# Patient Record
Sex: MALE | Race: WHITE | Employment: OTHER | URBAN - METROPOLITAN AREA
[De-identification: names, ages, dates, MRNs, and addresses within clinical notes are randomized per-mention and may not be internally consistent; named-entity substitution may affect disease eponyms.]

---

## 2017-01-10 PROBLEM — R06.00 DYSPNEA ON EXERTION: Status: ACTIVE | Noted: 2017-01-10

## 2017-02-23 PROBLEM — R06.00 DYSPNEA ON EXERTION: Status: RESOLVED | Noted: 2017-01-10 | Resolved: 2017-02-23

## 2017-08-29 ENCOUNTER — LAB REQUISITION (OUTPATIENT)
Dept: LAB | Facility: HOSPITAL | Age: 82
End: 2017-08-29
Attending: FAMILY MEDICINE
Payer: MEDICARE

## 2017-08-29 DIAGNOSIS — Z79.899 OTHER LONG TERM (CURRENT) DRUG THERAPY: ICD-10-CM

## 2017-08-29 LAB
INR BLD: 2.46 (ref 0.89–1.11)
PSA SERPL DL<=0.01 NG/ML-MCNC: 27.1 SECONDS (ref 12–14.3)

## 2017-08-29 PROCEDURE — 85610 PROTHROMBIN TIME: CPT | Performed by: FAMILY MEDICINE

## 2017-08-31 ENCOUNTER — OFFICE VISIT (OUTPATIENT)
Dept: SURGERY | Facility: CLINIC | Age: 82
End: 2017-08-31

## 2017-08-31 VITALS
SYSTOLIC BLOOD PRESSURE: 137 MMHG | DIASTOLIC BLOOD PRESSURE: 79 MMHG | HEIGHT: 75 IN | WEIGHT: 220 LBS | BODY MASS INDEX: 27.35 KG/M2 | HEART RATE: 61 BPM | TEMPERATURE: 98 F

## 2017-08-31 DIAGNOSIS — R35.1 NOCTURIA: Primary | ICD-10-CM

## 2017-08-31 DIAGNOSIS — R82.90 URINE FINDING: ICD-10-CM

## 2017-08-31 LAB
APPEARANCE: CLEAR
BILIRUB UR QL: NEGATIVE
CLARITY UR: CLEAR
COLOR UR: YELLOW
GLUCOSE UR-MCNC: NEGATIVE MG/DL
HGB UR QL STRIP.AUTO: NEGATIVE
KETONES UR-MCNC: NEGATIVE MG/DL
LEUKOCYTE ESTERASE UR QL STRIP.AUTO: NEGATIVE
MULTISTIX LOT#: NORMAL NUMERIC
NITRITE UR QL STRIP.AUTO: NEGATIVE
PH UR: 5 [PH] (ref 5–8)
PH, URINE: 5.5 (ref 4.5–8)
PROT UR-MCNC: NEGATIVE MG/DL
SP GR UR STRIP: 1.01 (ref 1–1.03)
SPECIFIC GRAVITY: 1.01 (ref 1–1.03)
URINE-COLOR: YELLOW
UROBILINOGEN UR STRIP-ACNC: <2
UROBILINOGEN,SEMI-QN: 0.2 MG/DL (ref 0–1.9)
VIT C UR-MCNC: NEGATIVE MG/DL

## 2017-08-31 PROCEDURE — G0463 HOSPITAL OUTPT CLINIC VISIT: HCPCS | Performed by: UROLOGY

## 2017-08-31 PROCEDURE — 99204 OFFICE O/P NEW MOD 45 MIN: CPT | Performed by: UROLOGY

## 2017-08-31 PROCEDURE — 81003 URINALYSIS AUTO W/O SCOPE: CPT | Performed by: UROLOGY

## 2017-08-31 NOTE — PROGRESS NOTES
SUBJECTIVE:  Maikel Jurist is a 80year old male who presents for a consultation at the request of, and a copy of this note will be sent to, Dr. Mao Roman, for evaluation of  benign prostatic hyperplasia. He states that the problem is unchanged.  Symptoms i pleurisy. CARDIOVASCULAR:  Negative for pain or chest discomfort, dizziness or fainting, palpitations, leg swelling, nocturia, or claudication.   GASTROINTESTINAL:  Negative for nausea, vomiting, diarrhea, constipation, heartburn or indigestion, abdominal

## 2017-09-01 ENCOUNTER — OFFICE VISIT (OUTPATIENT)
Dept: WOUND CARE | Facility: HOSPITAL | Age: 82
End: 2017-09-01
Attending: NURSE PRACTITIONER
Payer: MEDICARE

## 2017-09-01 DIAGNOSIS — L89.322 STAGE II PRESSURE ULCER OF LEFT BUTTOCK (HCC): Primary | ICD-10-CM

## 2017-09-01 DIAGNOSIS — L89.312 STAGE II PRESSURE ULCER OF RIGHT BUTTOCK (HCC): ICD-10-CM

## 2017-09-01 PROCEDURE — 97161 PT EVAL LOW COMPLEX 20 MIN: CPT

## 2017-09-01 NOTE — PROGRESS NOTES
Thank you for your referral. Please co-sign  this letter using the CO-SIGN button to certify the need for these services furnished under this plan of treatment and while under my care. If you have any questions, please contact me at Dept.  Dept: [phone]

## 2017-09-01 NOTE — PROGRESS NOTES
Subjective    Chief Complaint  This information was obtained from the patient  The patient is new to the [address] [facility],[address] here for an initial visit for the evaluation and management of non-healing wound(s). decubitus ulcer of right buttock stage 2.     Allergie Gastro Esoph.  Reflux Disease (GERD)  hypertrophy of prostate without urinary obstruction and other lower urinary tract symptoms  Osteoarthritis  Deep Vein Thrombosis (DVT)    Medications  warfarin 5 mg tablet oral tablet oral  pravastatin 20 mg tablet oral Wound #2 Left Buttock is an acute Stage 2 Pressure Injury Pressure Ulcer and has received a status of Not Healed. Initial wound encounter measurements are 6.5cm length x 1.8cm width x 0.01cm depth, with an area of 11.7 sq cm and a volume of 0.117 cubic cm. The patient's current level of functional limitation is [id number], other PT functional limitation, 0% impaired. Limitation is determined by clinical judgement. The projected goal for this patient is [id number], other PT functional limitation, 0% impaired. Thank you for your referral. Please co-sign  this letter using the CO-SIGN button to certify the need for these services furnished under this plan of treatment and while under my care. If you have any questions, please contact me at Dept.  Dept: [phone]

## 2017-09-05 ENCOUNTER — OFFICE VISIT (OUTPATIENT)
Dept: WOUND CARE | Facility: HOSPITAL | Age: 82
End: 2017-09-05
Attending: NURSE PRACTITIONER
Payer: MEDICARE

## 2017-09-05 DIAGNOSIS — L89.312 STAGE II PRESSURE ULCER OF RIGHT BUTTOCK (HCC): ICD-10-CM

## 2017-09-05 DIAGNOSIS — L89.322 STAGE II PRESSURE ULCER OF LEFT BUTTOCK (HCC): Primary | ICD-10-CM

## 2017-09-05 PROCEDURE — 99213 OFFICE O/P EST LOW 20 MIN: CPT

## 2017-09-05 NOTE — PROGRESS NOTES
Subjective    Chief Complaint  This information was obtained from the patient  The patient is new to the [address] [facility],[address] here for an initial visit for the evaluation and management of non-healing wound(s).  decubitus ulcer of right buttock stage 2.  9/5/17 no Stage 2 Pressure Injury Pressure Ulcer and has received a status of Not Healed. There is a small amount of sero-sanguineous drainage noted which has no odor. The patient reports a wound pain of level 0/10. The wound margin is well defined.  Wound bed has 51 using Tegaderm (1)  Selective / Non-selective debridement  Tissue and other material debrided: using Devitalized epidermis (1)  Debridement instrument used: using Forceps (1), Scissors (1)  Written PT Goals - . Short Term (4-6 weeks)  Reduce wound area by 1

## 2017-09-08 ENCOUNTER — OFFICE VISIT (OUTPATIENT)
Dept: WOUND CARE | Facility: HOSPITAL | Age: 82
End: 2017-09-08
Attending: NURSE PRACTITIONER
Payer: MEDICARE

## 2017-09-08 DIAGNOSIS — L89.312 STAGE II PRESSURE ULCER OF RIGHT BUTTOCK (HCC): ICD-10-CM

## 2017-09-08 DIAGNOSIS — L89.322 STAGE II PRESSURE ULCER OF LEFT BUTTOCK (HCC): ICD-10-CM

## 2017-09-08 DIAGNOSIS — S91.109A: Primary | ICD-10-CM

## 2017-09-08 PROCEDURE — 97597 DBRDMT OPN WND 1ST 20 CM/<: CPT

## 2017-09-08 NOTE — PROGRESS NOTES
Subjective    Chief Complaint  This information was obtained from the patient  The patient is new to the [address] [facility],[address] here for an initial visit for the evaluation and management of non-healing wound(s).  decubitus ulcer of right buttock stage 2.  9/8/17 pat a small amount of sanguineous drainage noted which has no odor. The patient reports a wound pain of level 0/10. The wound margin is well defined. Wound bed has 51-75% epithelialization, 1-25% eschar; no slough present. The wound is improving.    The periwou shower and it tore skin from the tip of his toe. Assessment  Pt presented with a new script to eval and treat his L toe wound. Pt presents with a partial thickness wound on the tip of his L great toe traumatic in nature.  pt attempted to perform self Signature(s): Date(s):  Treatment Notes Summary  Wound #1 (Right Buttock)  . Wound Treatment Note  Assessed patient’s pain status and effectiveness of pain management plan. Cleansed wound and periwound with non-cytotoxic agent.  using Wound Cleanser Spray debridement  Cleansed wound and periwound with non-cytotoxic agent.  using Wound Cleanser Spray (1)  Performed selective sharp debridement to wound  Tissue and other material debrided: using Devitalized dermis (1), Devitalized epidermis (1)  Debridement ins

## 2017-09-12 ENCOUNTER — OFFICE VISIT (OUTPATIENT)
Dept: WOUND CARE | Facility: HOSPITAL | Age: 82
End: 2017-09-12
Attending: NURSE PRACTITIONER
Payer: MEDICARE

## 2017-09-12 DIAGNOSIS — L89.322 STAGE II PRESSURE ULCER OF LEFT BUTTOCK (HCC): ICD-10-CM

## 2017-09-12 DIAGNOSIS — S91.109A: Primary | ICD-10-CM

## 2017-09-12 DIAGNOSIS — L89.312 STAGE II PRESSURE ULCER OF RIGHT BUTTOCK (HCC): ICD-10-CM

## 2017-09-12 PROCEDURE — 99212 OFFICE O/P EST SF 10 MIN: CPT

## 2017-09-12 NOTE — PROGRESS NOTES
Subjective    Chief Complaint  This information was obtained from the patient  The patient is new to the [address] [facility],[address] here for an initial visit for the evaluation and management of non-healing wound(s).  decubitus ulcer of right buttock stage 2.  9/8/17 pat Wound #3 Left Toe great is an acute Partial Thickness Trauma Wound and has received a status of Not Healed. Subsequent wound encounter measurements are 0.03cm length x 0.02cm width x 0.1cm depth, with an area of 0.001 sq cm and a volume of 0 cubic cm.  Ther Continue 2x/wk for skilled PT in wound care clinic for wound care, specialty dressing applications, debridement of devitalized tissue as needed and pt may benefit from a transition to a barrier cream to protect reviatalized tissue regions when wounds are a Assessed patient’s pain status and effectiveness of pain management plan. Cleansed wound and periwound with non-cytotoxic agent. using Wound Cleanser Spray (1)  Applied Primary Wound Dressing.  using Gauze (sterile) 4x4 (2), Xeroform 2x2 (1)  Dressing secu

## 2017-09-15 ENCOUNTER — OFFICE VISIT (OUTPATIENT)
Dept: WOUND CARE | Facility: HOSPITAL | Age: 82
End: 2017-09-15
Attending: NURSE PRACTITIONER
Payer: MEDICARE

## 2017-09-15 DIAGNOSIS — S91.109A: Primary | ICD-10-CM

## 2017-09-15 DIAGNOSIS — L89.322 STAGE II PRESSURE ULCER OF LEFT BUTTOCK (HCC): ICD-10-CM

## 2017-09-15 DIAGNOSIS — L89.312 STAGE II PRESSURE ULCER OF RIGHT BUTTOCK (HCC): ICD-10-CM

## 2017-09-15 PROCEDURE — 99212 OFFICE O/P EST SF 10 MIN: CPT

## 2017-09-15 NOTE — PROGRESS NOTES
Subjective    Chief Complaint  This information was obtained from the patient  The patient is new to the [address] [facility],[address] here for an initial visit for the evaluation and management of non-healing wound(s).  decubitus ulcer of right buttock stage 2.  9/8/17 pat wound encounter measurements are 0.4cm length x 0.2cm width x 0.01cm depth, with an area of 0.08 sq cm and a volume of 0.001 cubic cm. No tunneling has been noted. No sinus tract has been noted. No undermining has been noted. There was no drainage noted.  Lilly Hernandes buttock, stage 2  S91.102S - Unspecified open wound of left great toe without damage to nail, sequela        Plan    Continue 1x/wk 2-3 more visits anticipated to resolve R buttock.  Transition to barrier cream upon wound resolution for skin protection and

## 2017-09-19 ENCOUNTER — OFFICE VISIT (OUTPATIENT)
Dept: WOUND CARE | Facility: HOSPITAL | Age: 82
End: 2017-09-19
Attending: NURSE PRACTITIONER
Payer: MEDICARE

## 2017-09-19 DIAGNOSIS — S91.109A: ICD-10-CM

## 2017-09-19 DIAGNOSIS — L89.322 STAGE II PRESSURE ULCER OF LEFT BUTTOCK (HCC): Primary | ICD-10-CM

## 2017-09-19 DIAGNOSIS — L89.312 STAGE II PRESSURE ULCER OF RIGHT BUTTOCK (HCC): ICD-10-CM

## 2017-09-19 PROCEDURE — 99211 OFF/OP EST MAY X REQ PHY/QHP: CPT

## 2017-09-19 NOTE — PROGRESS NOTES
Subjective    Chief Complaint  This information was obtained from the patient  The patient is new to the [address] [facility],[address] here for an initial visit for the evaluation and management of non-healing wound(s).  decubitus ulcer of right buttock stage 2.  9/8/17 pat Wound #1 Right Buttock is an acute Pressure Ulcer and has received an outcome of Resolved. Subsequent wound encounter measurements are 0cm length x 0cm width x 0cm depth, with an area of 0 sq cm and a volume of 0 cubic cm. No tunneling has been noted.  No s .Wound Treatment Note  Assessed patient’s pain status and effectiveness of pain management plan. Cleansed wound and periwound with non-cytotoxic agent. using Wound Cleanser Spray (1)  Applied topical product to jd-wound area avoiding wound base.  using B

## 2017-09-22 ENCOUNTER — OFFICE VISIT (OUTPATIENT)
Dept: WOUND CARE | Facility: HOSPITAL | Age: 82
End: 2017-09-22
Attending: NURSE PRACTITIONER
Payer: MEDICARE

## 2017-09-22 DIAGNOSIS — S91.109A: ICD-10-CM

## 2017-09-22 DIAGNOSIS — L89.312 STAGE II PRESSURE ULCER OF RIGHT BUTTOCK (HCC): ICD-10-CM

## 2017-09-22 DIAGNOSIS — L89.322 STAGE II PRESSURE ULCER OF LEFT BUTTOCK (HCC): Primary | ICD-10-CM

## 2017-09-22 PROCEDURE — 99211 OFF/OP EST MAY X REQ PHY/QHP: CPT

## 2017-09-22 NOTE — PROGRESS NOTES
Subjective    Chief Complaint  This information was obtained from the patient  The patient is new to the [address] [facility],[address] here for an initial visit for the evaluation and management of non-healing wound(s).  decubitus ulcer of right buttock stage 2.  9/8/17 pat care with daily application of barrier cream after bathing for skin protection in his buttock region. Pt is wearing a moisture  diaper to prevent denuded tissue and reoccurance of skin breakdown. D/C wcc visits at this time.    Active Problems    IC

## 2017-09-26 ENCOUNTER — APPOINTMENT (OUTPATIENT)
Dept: WOUND CARE | Facility: HOSPITAL | Age: 82
End: 2017-09-26
Attending: NURSE PRACTITIONER
Payer: MEDICARE

## 2017-09-29 ENCOUNTER — APPOINTMENT (OUTPATIENT)
Dept: WOUND CARE | Facility: HOSPITAL | Age: 82
End: 2017-09-29
Attending: NURSE PRACTITIONER
Payer: MEDICARE

## 2017-11-28 PROBLEM — L89.312 STAGE II PRESSURE ULCER OF RIGHT BUTTOCK (HCC): Status: RESOLVED | Noted: 2017-09-01 | Resolved: 2017-11-28

## 2017-11-28 PROBLEM — S91.109A WOUND, OPEN, TOE: Status: RESOLVED | Noted: 2017-09-08 | Resolved: 2017-11-28

## 2017-11-28 PROBLEM — L89.322 STAGE II PRESSURE ULCER OF LEFT BUTTOCK (HCC): Status: RESOLVED | Noted: 2017-09-01 | Resolved: 2017-11-28

## 2017-12-17 ENCOUNTER — HOSPITAL ENCOUNTER (EMERGENCY)
Facility: HOSPITAL | Age: 82
Discharge: HOME | End: 2017-12-17
Attending: EMERGENCY MEDICINE
Payer: MEDICARE

## 2017-12-17 VITALS
HEART RATE: 56 BPM | TEMPERATURE: 97 F | RESPIRATION RATE: 20 BRPM | SYSTOLIC BLOOD PRESSURE: 178 MMHG | DIASTOLIC BLOOD PRESSURE: 80 MMHG | BODY MASS INDEX: 27.35 KG/M2 | HEIGHT: 75 IN | OXYGEN SATURATION: 96 % | WEIGHT: 220 LBS

## 2017-12-17 DIAGNOSIS — S91.115A LACERATION OF LESSER TOE OF LEFT FOOT WITHOUT FOREIGN BODY PRESENT OR DAMAGE TO NAIL, INITIAL ENCOUNTER: Primary | ICD-10-CM

## 2017-12-17 PROCEDURE — 90471 IMMUNIZATION ADMIN: CPT

## 2017-12-17 PROCEDURE — 99283 EMERGENCY DEPT VISIT LOW MDM: CPT

## 2017-12-17 NOTE — ED PROVIDER NOTES
Patient Seen in: [facility] AND [facility] Emergency Department    History   Patient presents with:  Laceration Abrasion (integumentary)    Stated Complaint:     HPI    The patient is an 80-year-old male who went to step on the scale and caught his left third to Left foot: There is laceration. There is normal range of motion, no tenderness, no bony tenderness, no swelling and normal capillary refill.         Feet:    Other than laceration to toe no bony tenderness pain or swelling   Nursing note and vitals rev

## 2017-12-17 NOTE — ED INITIAL ASSESSMENT (HPI)
Pt reports a laceration to bottom his right foot on a scale. Pt states that it happened at 1130 today. Pt states his tetanus is not up to date.  Pt reports no pain but is taking coumadin

## 2018-01-17 PROBLEM — Z51.81 MONITORING FOR LONG-TERM ANTICOAGULANT USE: Status: ACTIVE | Noted: 2018-01-17

## 2018-01-17 PROBLEM — Z79.01 MONITORING FOR LONG-TERM ANTICOAGULANT USE: Status: ACTIVE | Noted: 2018-01-17

## 2018-06-08 ENCOUNTER — HOSPITAL ENCOUNTER (INPATIENT)
Facility: HOSPITAL | Age: 83
LOS: 3 days | Discharge: NURSING, REHAB OR LONG-TERM CARE | DRG: 481 | End: 2018-06-11
Attending: EMERGENCY MEDICINE | Admitting: HOSPITALIST
Payer: MEDICARE

## 2018-06-08 ENCOUNTER — APPOINTMENT (OUTPATIENT)
Dept: GENERAL RADIOLOGY | Facility: HOSPITAL | Age: 83
DRG: 481 | End: 2018-06-08
Attending: EMERGENCY MEDICINE
Payer: MEDICARE

## 2018-06-08 DIAGNOSIS — S72.001A CLOSED RIGHT HIP FRACTURE, INITIAL ENCOUNTER (HCC): Primary | ICD-10-CM

## 2018-06-08 DIAGNOSIS — Z79.01 WARFARIN ANTICOAGULATION: ICD-10-CM

## 2018-06-08 PROCEDURE — 71045 X-RAY EXAM CHEST 1 VIEW: CPT | Performed by: EMERGENCY MEDICINE

## 2018-06-08 PROCEDURE — 93010 ELECTROCARDIOGRAM REPORT: CPT | Performed by: EMERGENCY MEDICINE

## 2018-06-08 PROCEDURE — 86850 RBC ANTIBODY SCREEN: CPT | Performed by: EMERGENCY MEDICINE

## 2018-06-08 PROCEDURE — 73502 X-RAY EXAM HIP UNI 2-3 VIEWS: CPT | Performed by: EMERGENCY MEDICINE

## 2018-06-08 PROCEDURE — 85610 PROTHROMBIN TIME: CPT | Performed by: EMERGENCY MEDICINE

## 2018-06-08 PROCEDURE — 87640 STAPH A DNA AMP PROBE: CPT | Performed by: EMERGENCY MEDICINE

## 2018-06-08 PROCEDURE — 86901 BLOOD TYPING SEROLOGIC RH(D): CPT | Performed by: EMERGENCY MEDICINE

## 2018-06-08 PROCEDURE — 96365 THER/PROPH/DIAG IV INF INIT: CPT

## 2018-06-08 PROCEDURE — 87641 MR-STAPH DNA AMP PROBE: CPT | Performed by: EMERGENCY MEDICINE

## 2018-06-08 PROCEDURE — 96375 TX/PRO/DX INJ NEW DRUG ADDON: CPT

## 2018-06-08 PROCEDURE — 81001 URINALYSIS AUTO W/SCOPE: CPT | Performed by: EMERGENCY MEDICINE

## 2018-06-08 PROCEDURE — 85025 COMPLETE CBC W/AUTO DIFF WBC: CPT | Performed by: EMERGENCY MEDICINE

## 2018-06-08 PROCEDURE — 80048 BASIC METABOLIC PNL TOTAL CA: CPT | Performed by: EMERGENCY MEDICINE

## 2018-06-08 PROCEDURE — 99285 EMERGENCY DEPT VISIT HI MDM: CPT

## 2018-06-08 PROCEDURE — 93005 ELECTROCARDIOGRAM TRACING: CPT

## 2018-06-08 PROCEDURE — 86900 BLOOD TYPING SEROLOGIC ABO: CPT | Performed by: EMERGENCY MEDICINE

## 2018-06-08 RX ORDER — HYDROCODONE BITARTRATE AND ACETAMINOPHEN 5; 325 MG/1; MG/1
2 TABLET ORAL EVERY 4 HOURS PRN
Status: DISCONTINUED | OUTPATIENT
Start: 2018-06-08 | End: 2018-06-09

## 2018-06-08 RX ORDER — SODIUM CHLORIDE 0.9 % (FLUSH) 0.9 %
3 SYRINGE (ML) INJECTION AS NEEDED
Status: DISCONTINUED | OUTPATIENT
Start: 2018-06-08 | End: 2018-06-11

## 2018-06-08 RX ORDER — ONDANSETRON 2 MG/ML
4 INJECTION INTRAMUSCULAR; INTRAVENOUS ONCE
Status: COMPLETED | OUTPATIENT
Start: 2018-06-08 | End: 2018-06-08

## 2018-06-08 RX ORDER — HYDROCODONE BITARTRATE AND ACETAMINOPHEN 5; 325 MG/1; MG/1
1 TABLET ORAL EVERY 4 HOURS PRN
Status: DISCONTINUED | OUTPATIENT
Start: 2018-06-08 | End: 2018-06-09

## 2018-06-08 RX ORDER — ACETAMINOPHEN 325 MG/1
650 TABLET ORAL EVERY 4 HOURS PRN
Status: DISCONTINUED | OUTPATIENT
Start: 2018-06-08 | End: 2018-06-09

## 2018-06-08 RX ORDER — MORPHINE SULFATE 2 MG/ML
2 INJECTION, SOLUTION INTRAMUSCULAR; INTRAVENOUS EVERY 2 HOUR PRN
Status: DISCONTINUED | OUTPATIENT
Start: 2018-06-08 | End: 2018-06-09

## 2018-06-08 RX ORDER — MORPHINE SULFATE 4 MG/ML
4 INJECTION, SOLUTION INTRAMUSCULAR; INTRAVENOUS EVERY 2 HOUR PRN
Status: DISCONTINUED | OUTPATIENT
Start: 2018-06-08 | End: 2018-06-09

## 2018-06-08 RX ORDER — MORPHINE SULFATE 2 MG/ML
1 INJECTION, SOLUTION INTRAMUSCULAR; INTRAVENOUS EVERY 2 HOUR PRN
Status: DISCONTINUED | OUTPATIENT
Start: 2018-06-08 | End: 2018-06-09

## 2018-06-08 RX ORDER — SODIUM CHLORIDE 9 MG/ML
INJECTION, SOLUTION INTRAVENOUS CONTINUOUS
Status: DISCONTINUED | OUTPATIENT
Start: 2018-06-08 | End: 2018-06-11

## 2018-06-08 RX ORDER — MORPHINE SULFATE 4 MG/ML
4 INJECTION, SOLUTION INTRAMUSCULAR; INTRAVENOUS EVERY 30 MIN PRN
Status: DISCONTINUED | OUTPATIENT
Start: 2018-06-08 | End: 2018-06-08

## 2018-06-08 RX ORDER — CEFAZOLIN SODIUM/WATER 2 G/20 ML
2 SYRINGE (ML) INTRAVENOUS ONCE
Status: COMPLETED | OUTPATIENT
Start: 2018-06-09 | End: 2018-06-09

## 2018-06-08 NOTE — ED PROVIDER NOTES
Patient Seen in: [facility] AND [facility] Emergency Department    History   Patient presents with:  Fall (musculoskeletal, neurologic)    Stated Complaint:     HPI    Patient presents to the emergency department with complaint of pain to the right hip after fal Review of Systems  Constitutional: no fever, has otherwise been feeling well, normal appetite, normal energy  Cardiovascular: no chest pain  Respiratory: no shortness of breath  Gastrointestinal: no abdominal pain, no nausea, no vomiting  Ge testing. We will give morphine and Zofran as well. Patient x-ray did show right hip fracture. Preoperative testing was ordered INR was 2.7. I did notify his orthopedic surgeon who did his left hip Dr. Dottie Delcid.   Also notified admitting physician  cardiology consults recently does not make any notation of this.               MDM       Normal  Pulse oximetry    Cardiac Monitor:     Disposition and Plan     We recommend that you schedule follow up care with a primary care provider within the next three

## 2018-06-09 ENCOUNTER — APPOINTMENT (OUTPATIENT)
Dept: GENERAL RADIOLOGY | Facility: HOSPITAL | Age: 83
DRG: 481 | End: 2018-06-09
Attending: ORTHOPAEDIC SURGERY
Payer: MEDICARE

## 2018-06-09 ENCOUNTER — ANESTHESIA (OUTPATIENT)
Dept: SURGERY | Facility: HOSPITAL | Age: 83
DRG: 481 | End: 2018-06-09
Payer: MEDICARE

## 2018-06-09 ENCOUNTER — SURGERY (OUTPATIENT)
Age: 83
End: 2018-06-09

## 2018-06-09 ENCOUNTER — ANESTHESIA EVENT (OUTPATIENT)
Dept: SURGERY | Facility: HOSPITAL | Age: 83
DRG: 481 | End: 2018-06-09
Payer: MEDICARE

## 2018-06-09 PROCEDURE — 85610 PROTHROMBIN TIME: CPT | Performed by: HOSPITALIST

## 2018-06-09 PROCEDURE — 73502 X-RAY EXAM HIP UNI 2-3 VIEWS: CPT | Performed by: ORTHOPAEDIC SURGERY

## 2018-06-09 PROCEDURE — 85610 PROTHROMBIN TIME: CPT | Performed by: ORTHOPAEDIC SURGERY

## 2018-06-09 PROCEDURE — 0QH604Z INSERTION OF INTERNAL FIXATION DEVICE INTO RIGHT UPPER FEMUR, OPEN APPROACH: ICD-10-PCS | Performed by: ORTHOPAEDIC SURGERY

## 2018-06-09 PROCEDURE — 36430 TRANSFUSION BLD/BLD COMPNT: CPT

## 2018-06-09 PROCEDURE — 81001 URINALYSIS AUTO W/SCOPE: CPT | Performed by: INTERNAL MEDICINE

## 2018-06-09 PROCEDURE — 51701 INSERT BLADDER CATHETER: CPT

## 2018-06-09 PROCEDURE — 80048 BASIC METABOLIC PNL TOTAL CA: CPT | Performed by: HOSPITALIST

## 2018-06-09 PROCEDURE — 86927 PLASMA FRESH FROZEN: CPT

## 2018-06-09 PROCEDURE — 30233K1 TRANSFUSION OF NONAUTOLOGOUS FROZEN PLASMA INTO PERIPHERAL VEIN, PERCUTANEOUS APPROACH: ICD-10-PCS | Performed by: INTERNAL MEDICINE

## 2018-06-09 PROCEDURE — 76001 XR C-ARM FLUORO >1 HOUR  (CPT=76001): CPT | Performed by: ORTHOPAEDIC SURGERY

## 2018-06-09 DEVICE — IMPLANTABLE DEVICE: Type: IMPLANTABLE DEVICE | Site: HIP | Status: FUNCTIONAL

## 2018-06-09 DEVICE — PLATE TUBE STD 135DEG 4 HOLE: Type: IMPLANTABLE DEVICE | Site: HIP | Status: FUNCTIONAL

## 2018-06-09 RX ORDER — SODIUM CHLORIDE, SODIUM LACTATE, POTASSIUM CHLORIDE, CALCIUM CHLORIDE 600; 310; 30; 20 MG/100ML; MG/100ML; MG/100ML; MG/100ML
INJECTION, SOLUTION INTRAVENOUS CONTINUOUS
Status: DISCONTINUED | OUTPATIENT
Start: 2018-06-09 | End: 2018-06-09 | Stop reason: HOSPADM

## 2018-06-09 RX ORDER — METOPROLOL TARTRATE 5 MG/5ML
2.5 INJECTION INTRAVENOUS ONCE
Status: DISCONTINUED | OUTPATIENT
Start: 2018-06-09 | End: 2018-06-09 | Stop reason: HOSPADM

## 2018-06-09 RX ORDER — DEXAMETHASONE SODIUM PHOSPHATE 4 MG/ML
VIAL (ML) INJECTION AS NEEDED
Status: DISCONTINUED | OUTPATIENT
Start: 2018-06-09 | End: 2018-06-09 | Stop reason: SURG

## 2018-06-09 RX ORDER — DIPHENHYDRAMINE HYDROCHLORIDE 50 MG/ML
12.5 INJECTION INTRAMUSCULAR; INTRAVENOUS EVERY 4 HOURS PRN
Status: DISCONTINUED | OUTPATIENT
Start: 2018-06-09 | End: 2018-06-11

## 2018-06-09 RX ORDER — HYDROCODONE BITARTRATE AND ACETAMINOPHEN 5; 325 MG/1; MG/1
1 TABLET ORAL AS NEEDED
Status: DISCONTINUED | OUTPATIENT
Start: 2018-06-09 | End: 2018-06-09 | Stop reason: HOSPADM

## 2018-06-09 RX ORDER — BISACODYL 10 MG
10 SUPPOSITORY, RECTAL RECTAL
Status: DISCONTINUED | OUTPATIENT
Start: 2018-06-09 | End: 2018-06-11

## 2018-06-09 RX ORDER — HYDROMORPHONE HYDROCHLORIDE 1 MG/ML
0.4 INJECTION, SOLUTION INTRAMUSCULAR; INTRAVENOUS; SUBCUTANEOUS EVERY 5 MIN PRN
Status: DISCONTINUED | OUTPATIENT
Start: 2018-06-09 | End: 2018-06-09 | Stop reason: HOSPADM

## 2018-06-09 RX ORDER — HYDROMORPHONE HYDROCHLORIDE 1 MG/ML
0.4 INJECTION, SOLUTION INTRAMUSCULAR; INTRAVENOUS; SUBCUTANEOUS EVERY 2 HOUR PRN
Status: DISCONTINUED | OUTPATIENT
Start: 2018-06-09 | End: 2018-06-11

## 2018-06-09 RX ORDER — SODIUM CHLORIDE 0.9 % (FLUSH) 0.9 %
10 SYRINGE (ML) INJECTION AS NEEDED
Status: DISCONTINUED | OUTPATIENT
Start: 2018-06-09 | End: 2018-06-11

## 2018-06-09 RX ORDER — DOCUSATE SODIUM 100 MG/1
100 CAPSULE, LIQUID FILLED ORAL 2 TIMES DAILY
Status: DISCONTINUED | OUTPATIENT
Start: 2018-06-09 | End: 2018-06-11

## 2018-06-09 RX ORDER — SODIUM CHLORIDE 9 MG/ML
INJECTION, SOLUTION INTRAVENOUS ONCE
Status: COMPLETED | OUTPATIENT
Start: 2018-06-09 | End: 2018-06-09

## 2018-06-09 RX ORDER — HALOPERIDOL 5 MG/ML
0.25 INJECTION INTRAMUSCULAR ONCE AS NEEDED
Status: DISCONTINUED | OUTPATIENT
Start: 2018-06-09 | End: 2018-06-09 | Stop reason: HOSPADM

## 2018-06-09 RX ORDER — WARFARIN SODIUM 7.5 MG/1
7.5 TABLET ORAL ONCE
Status: COMPLETED | OUTPATIENT
Start: 2018-06-09 | End: 2018-06-09

## 2018-06-09 RX ORDER — ONDANSETRON 2 MG/ML
4 INJECTION INTRAMUSCULAR; INTRAVENOUS ONCE AS NEEDED
Status: DISCONTINUED | OUTPATIENT
Start: 2018-06-09 | End: 2018-06-09 | Stop reason: HOSPADM

## 2018-06-09 RX ORDER — HYDROMORPHONE HYDROCHLORIDE 1 MG/ML
0.6 INJECTION, SOLUTION INTRAMUSCULAR; INTRAVENOUS; SUBCUTANEOUS EVERY 5 MIN PRN
Status: DISCONTINUED | OUTPATIENT
Start: 2018-06-09 | End: 2018-06-09 | Stop reason: HOSPADM

## 2018-06-09 RX ORDER — DIPHENHYDRAMINE HYDROCHLORIDE 50 MG/ML
25 INJECTION INTRAMUSCULAR; INTRAVENOUS ONCE AS NEEDED
Status: ACTIVE | OUTPATIENT
Start: 2018-06-09 | End: 2018-06-09

## 2018-06-09 RX ORDER — ONDANSETRON 2 MG/ML
INJECTION INTRAMUSCULAR; INTRAVENOUS AS NEEDED
Status: DISCONTINUED | OUTPATIENT
Start: 2018-06-09 | End: 2018-06-09 | Stop reason: SURG

## 2018-06-09 RX ORDER — EPHEDRINE SULFATE 50 MG/ML
INJECTION, SOLUTION INTRAVENOUS AS NEEDED
Status: DISCONTINUED | OUTPATIENT
Start: 2018-06-09 | End: 2018-06-09 | Stop reason: SURG

## 2018-06-09 RX ORDER — CEFAZOLIN SODIUM/WATER 2 G/20 ML
2 SYRINGE (ML) INTRAVENOUS EVERY 8 HOURS
Status: COMPLETED | OUTPATIENT
Start: 2018-06-09 | End: 2018-06-10

## 2018-06-09 RX ORDER — GLYCOPYRROLATE 0.2 MG/ML
INJECTION, SOLUTION INTRAMUSCULAR; INTRAVENOUS AS NEEDED
Status: DISCONTINUED | OUTPATIENT
Start: 2018-06-09 | End: 2018-06-09 | Stop reason: SURG

## 2018-06-09 RX ORDER — HYDROCODONE BITARTRATE AND ACETAMINOPHEN 5; 325 MG/1; MG/1
1 TABLET ORAL EVERY 4 HOURS PRN
Status: DISCONTINUED | OUTPATIENT
Start: 2018-06-09 | End: 2018-06-11

## 2018-06-09 RX ORDER — SENNOSIDES 8.6 MG
17.2 TABLET ORAL NIGHTLY
Status: DISCONTINUED | OUTPATIENT
Start: 2018-06-09 | End: 2018-06-11

## 2018-06-09 RX ORDER — HYDROCODONE BITARTRATE AND ACETAMINOPHEN 5; 325 MG/1; MG/1
2 TABLET ORAL AS NEEDED
Status: DISCONTINUED | OUTPATIENT
Start: 2018-06-09 | End: 2018-06-09 | Stop reason: HOSPADM

## 2018-06-09 RX ORDER — POLYETHYLENE GLYCOL 3350 17 G/17G
17 POWDER, FOR SOLUTION ORAL DAILY PRN
Status: DISCONTINUED | OUTPATIENT
Start: 2018-06-09 | End: 2018-06-11

## 2018-06-09 RX ORDER — SODIUM PHOSPHATE, DIBASIC AND SODIUM PHOSPHATE, MONOBASIC 7; 19 G/133ML; G/133ML
1 ENEMA RECTAL ONCE AS NEEDED
Status: DISCONTINUED | OUTPATIENT
Start: 2018-06-09 | End: 2018-06-11

## 2018-06-09 RX ORDER — DIPHENHYDRAMINE HCL 25 MG
25 CAPSULE ORAL EVERY 4 HOURS PRN
Status: DISCONTINUED | OUTPATIENT
Start: 2018-06-09 | End: 2018-06-11

## 2018-06-09 RX ORDER — HYDROMORPHONE HYDROCHLORIDE 1 MG/ML
0.8 INJECTION, SOLUTION INTRAMUSCULAR; INTRAVENOUS; SUBCUTANEOUS EVERY 2 HOUR PRN
Status: DISCONTINUED | OUTPATIENT
Start: 2018-06-09 | End: 2018-06-11

## 2018-06-09 RX ORDER — HYDROMORPHONE HYDROCHLORIDE 1 MG/ML
0.2 INJECTION, SOLUTION INTRAMUSCULAR; INTRAVENOUS; SUBCUTANEOUS EVERY 2 HOUR PRN
Status: DISCONTINUED | OUTPATIENT
Start: 2018-06-09 | End: 2018-06-11

## 2018-06-09 RX ORDER — METOCLOPRAMIDE HYDROCHLORIDE 5 MG/ML
10 INJECTION INTRAMUSCULAR; INTRAVENOUS EVERY 6 HOURS PRN
Status: ACTIVE | OUTPATIENT
Start: 2018-06-09 | End: 2018-06-11

## 2018-06-09 RX ORDER — ONDANSETRON 2 MG/ML
4 INJECTION INTRAMUSCULAR; INTRAVENOUS EVERY 4 HOURS PRN
Status: DISCONTINUED | OUTPATIENT
Start: 2018-06-09 | End: 2018-06-11

## 2018-06-09 RX ORDER — LIDOCAINE HYDROCHLORIDE 10 MG/ML
INJECTION, SOLUTION EPIDURAL; INFILTRATION; INTRACAUDAL; PERINEURAL AS NEEDED
Status: DISCONTINUED | OUTPATIENT
Start: 2018-06-09 | End: 2018-06-09 | Stop reason: SURG

## 2018-06-09 RX ORDER — HYDROCODONE BITARTRATE AND ACETAMINOPHEN 5; 325 MG/1; MG/1
2 TABLET ORAL EVERY 4 HOURS PRN
Status: DISCONTINUED | OUTPATIENT
Start: 2018-06-09 | End: 2018-06-11

## 2018-06-09 RX ORDER — ACETAMINOPHEN 325 MG/1
650 TABLET ORAL EVERY 4 HOURS PRN
Status: DISCONTINUED | OUTPATIENT
Start: 2018-06-09 | End: 2018-06-11

## 2018-06-09 RX ORDER — NALOXONE HYDROCHLORIDE 0.4 MG/ML
80 INJECTION, SOLUTION INTRAMUSCULAR; INTRAVENOUS; SUBCUTANEOUS AS NEEDED
Status: DISCONTINUED | OUTPATIENT
Start: 2018-06-09 | End: 2018-06-09 | Stop reason: HOSPADM

## 2018-06-09 RX ORDER — HYDROMORPHONE HYDROCHLORIDE 1 MG/ML
0.2 INJECTION, SOLUTION INTRAMUSCULAR; INTRAVENOUS; SUBCUTANEOUS EVERY 5 MIN PRN
Status: DISCONTINUED | OUTPATIENT
Start: 2018-06-09 | End: 2018-06-09 | Stop reason: HOSPADM

## 2018-06-09 RX ADMIN — SODIUM CHLORIDE: 9 INJECTION, SOLUTION INTRAVENOUS at 12:25:00

## 2018-06-09 RX ADMIN — DEXAMETHASONE SODIUM PHOSPHATE 4 MG: 4 MG/ML VIAL (ML) INJECTION at 13:33:00

## 2018-06-09 RX ADMIN — EPHEDRINE SULFATE 10 MG: 50 INJECTION, SOLUTION INTRAVENOUS at 12:40:00

## 2018-06-09 RX ADMIN — CEFAZOLIN SODIUM/WATER 2 G: 2 G/20 ML SYRINGE (ML) INTRAVENOUS at 12:40:00

## 2018-06-09 RX ADMIN — GLYCOPYRROLATE 0.2 MG: 0.2 INJECTION, SOLUTION INTRAMUSCULAR; INTRAVENOUS at 12:41:00

## 2018-06-09 RX ADMIN — SODIUM CHLORIDE: 9 INJECTION, SOLUTION INTRAVENOUS at 13:30:00

## 2018-06-09 RX ADMIN — ONDANSETRON 4 MG: 2 INJECTION INTRAMUSCULAR; INTRAVENOUS at 13:33:00

## 2018-06-09 RX ADMIN — LIDOCAINE HYDROCHLORIDE 25 MG: 10 INJECTION, SOLUTION EPIDURAL; INFILTRATION; INTRACAUDAL; PERINEURAL at 12:33:00

## 2018-06-09 NOTE — H&P
General Medicine H&P     Patient presents with:  Fall (musculoskeletal, neurologic)       PCP: Donavon Boxer, MD    History of Present Illness: Patient is a 80year old male with PMH sig for afib, DVT/PE, GERD, OA, HL, who p/t [facility] ED c fall and hip (1.905 m)   Wt 222 lb 9.6 oz (101 kg)   SpO2 97%   BMI 27.82 kg/m²     Gen: NAD, A+O x 3  Neck: supple, no LAD  CV: rrr, +s1/s2, no murmors  Lungs: CTAB, no wheezes  Abd: s/nt/nd, +bs  LE: no c/e/e  Neuro: CN 2-12 intact, no focal deficits      LABS:     L otherwise unremarkable. SOFT TISSUES: Negative. No visible soft tissue swelling. EFFUSION: None visible. OTHER: Degenerative changes are seen within the sacroiliac joints.  Degenerative disc and facet disease is partially visualized in the lower lumbar s [phone]  6/9/2018  48:28 AM      **Certification      PHYSICIAN Certification of Need for Inpatient Hospitalization - Initial Certification    Patient will require inpatient services that will reasonably be expected to span two midnight's based on the

## 2018-06-09 NOTE — SPIRITUAL CARE NOTE
SD    RN called chp. To visit pt. Prior to his hip surgery. Kang Fischery to see him and pray with him while he was being transported for surgery. We prayed in the hallway. Pt. Was glad for that and his friend was with him. RN req. chp.  To visit him later in t

## 2018-06-09 NOTE — CONSULTS
[facility]    Report of Consultation    Marialuisa Garner Patient Status:  Inpatient    11/10/1928 MRN [id number]   Location [facility] 4W/SW/SE Attending Clement Cast MD   Hosp Day # 1 PCP Philipp Ly MD     Da (NORCO) 5-325 MG per tab 1 tablet, 1 tablet, Oral, Q4H PRN **OR** HYDROcodone-acetaminophen (NORCO) 5-325 MG per tab 2 tablet, 2 tablet, Oral, Q4H PRN  •  morphINE sulfate (PF) 2 MG/ML injection 1 mg, 1 mg, Intravenous, Q2H PRN **OR** morphINE sulfate (PF) dedicated left hip radiographs recommended when clinically appropriate.     Dictated by (CST): Austin Summers MD on 6/08/2018 at 19:58     Approved by (CST): Austin Summers MD on 6/08/2018 at 20:00          Impression and Plan:  Patient Active Problem List:

## 2018-06-09 NOTE — ANESTHESIA POSTPROCEDURE EVALUATION
Patient: Messi Lama    Procedure Summary     Date:  06/09/18 Room / Location:  [address] [place] [address] MAIN OR 05 / [address] [place] [address] MAIN OR    Anesthesia Start:  2603 Anesthesia Stop:  2593    Procedure:  HIP PINNING (Right ) Diagnosis:       Closed right hip fracture ([facility] [address]      (

## 2018-06-09 NOTE — ANESTHESIA PREPROCEDURE EVALUATION
Anesthesia PreOp Note    HPI:     Gopi Anderson is a 80year old male who presents for preoperative consultation requested by: Philipp Kee MD    Date of Surgery: 6/8/2018 - 6/9/2018    Procedure(s):  HIP PINNING  Indication: Closed right hip fractu Oral Tab Take 1 tablet (5 mg total) by mouth once daily. Disp: 90 tablet Rfl: 3 6/7/2018 at Unknown time   metoprolol Tartrate 25 MG Oral Tab Take 1 tablet (25 mg total) by mouth 2 (two) times daily.  Disp: 180 tablet Rfl: 3 6/8/2018 at Unknown time   Antolin mg 4 mg Intravenous Q2H PRN Petra Romero MD    [MAR Hold] metoprolol Tartrate (LOPRESSOR) tab 25 mg 25 mg Oral BID Petra Romero MD Stopped at 06/09/18 0936   [MAR Hold] mupirocin (BACTROBAN) 2% nasal ointment OINT 1 Application 1 Application Each Nare BID Nick 97.9 °F (36.6 °C) 97.2 °F (36.2 °C) 97.4 °F (36.3 °C) 97.6 °F (36.4 °C)   TempSrc: Oral Oral Oral Oral   SpO2: 97% 96% 97% 97%   Weight:       Height:            Anesthesia ROS/Med Hx and Physical Exam     Patient summary reviewed and Nursing notes reviewe

## 2018-06-10 PROCEDURE — 97110 THERAPEUTIC EXERCISES: CPT

## 2018-06-10 PROCEDURE — 97530 THERAPEUTIC ACTIVITIES: CPT

## 2018-06-10 PROCEDURE — 85610 PROTHROMBIN TIME: CPT | Performed by: HOSPITALIST

## 2018-06-10 PROCEDURE — 85027 COMPLETE CBC AUTOMATED: CPT | Performed by: ORTHOPAEDIC SURGERY

## 2018-06-10 PROCEDURE — 97162 PT EVAL MOD COMPLEX 30 MIN: CPT

## 2018-06-10 PROCEDURE — 97166 OT EVAL MOD COMPLEX 45 MIN: CPT

## 2018-06-10 RX ORDER — WARFARIN SODIUM 7.5 MG/1
7.5 TABLET ORAL NIGHTLY
Status: DISCONTINUED | OUTPATIENT
Start: 2018-06-10 | End: 2018-06-11

## 2018-06-10 NOTE — OCCUPATIONAL THERAPY NOTE
OCCUPATIONAL THERAPY EVALUATION - INPATIENT      Room Number: 424/424-A  Evaluation Date: 6/10/2018  Type of Evaluation: Initial  Presenting Problem: R hip fx    Physician Order: IP Consult to Occupational Therapy  Reason for Therapy: ADL/IADL Dysfunction weakness (generalized)    • Osteoarthritis    • Other and unspecified hyperlipidemia    • Pulmonary embolism [facility])    • Thoracic aortic atherosclerosis ([facility] [address]    • Unspecified essential hypertension        Past Surgical History  Past Surgical History:  2011 teeth?: A Little  -   Eating meals?: None    AM-PAC Score:  Score: 16  Approx Degree of Impairment: 53.32%  Standardized Score (AM-PAC Scale): 35.96  CMS Modifier (G-Code): CK    FUNCTIONAL TRANSFER ASSESSMENT  Supine to Sit : Moderate assistance (x2)  Sit

## 2018-06-10 NOTE — PHYSICAL THERAPY NOTE
Patient seen for am session. Transitioned OOB up to bs Chair. Please see pm note for details of both sessions.

## 2018-06-10 NOTE — PROGRESS NOTES
[facility]    Progress Note    Peggy Hind Patient Status:  Inpatient    11/10/1928 MRN [id number]   Location [facility] 4W/SW/SE Attending Sonam Cat MD   Hosp Day # 2 PCP MD Cornel Villasenor

## 2018-06-10 NOTE — PROGRESS NOTES
[facility] Hospitalist Progress Note     PCP: Zander Tavera MD    Chief Complaint: follow-up    Overnight/Interim Events:      SUBJECTIVE:  Pt reports pain ok.  No n/v.     BP lower earlier, 90/31, got IVF, better    OBJECTIVE:  Temp:  [97.2 °F (36.2 °C BID     • sodium chloride 100 mL/hr (06/10/18 1200)     Normal Saline Flush, Normal Saline Flush, sodium chloride 0.9%, acetaminophen **OR** HYDROcodone-acetaminophen **OR** HYDROcodone-acetaminophen, PEG 3350, magnesium hydroxide, bisacodyl, FLEET ENEMA,

## 2018-06-10 NOTE — PROGRESS NOTES
Dr. Hima Buckner notified in person about 7 beats of AIVR and Vtach. Will continue to monitor per tele and daily  Labs. RN to call if it continues. Then Cardiology will be notified.

## 2018-06-10 NOTE — PLAN OF CARE
CARDIOVASCULAR - ADULT    • Maintains optimal cardiac output and hemodynamic stability Progressing    • Absence of cardiac arrhythmias or at baseline Progressing        Low BP today. Fluid bolus given and metoprolol held this am. Monitor I/O and weight.

## 2018-06-10 NOTE — CM/SW NOTE
PALMIRA met with the pt. At bedside. The pt. Lives alone at [facility]. The pt. Reports being independent prior to admission with adls and ambulation. The pt. Uses a rolater walker and goes to the dining room for meals.   The pt does not

## 2018-06-10 NOTE — PHYSICAL THERAPY NOTE
PHYSICAL THERAPY HIP EVALUATION - INPATIENT (AND FOLLOW UP - PM)    Room Number: 424/424-A  Evaluation Date: 6/10/2018  Type of Evaluation: Initial  Physician Order: PT Eval and Treat    Presenting Problem: Right hip pinning due to fracture  Reason for The hospital.      DISCHARGE RECOMMENDATIONS  PT Discharge Recommendations: Cont skilled therapy in a supervised setting;24 hour care/supervision;Sub-acute rehabilitation    PLAN  PT Treatment Plan: Bed mobility; Endurance; Patient education;Gait training;Range extremity (WBAT)        R Lower Extremity: Weight Bearing as Tolerated       PAIN ASSESSMENT  Rating: 3  Location: Right hip (Only with weight bearing)  Management Techniques: Breathing techniques;Repositioning; Activity promotion;Relaxation    COGNITION  · session/findings; All patient questions and concerns addressed; Alarm set    CURRENT GOALS    Goals to be met by: 6/24/18  Patient Goal Patient's self-stated goal is: get better and stronger, return home.    Goal #1 Patient is able to demonstrate supine - sit

## 2018-06-11 VITALS
WEIGHT: 226.69 LBS | OXYGEN SATURATION: 98 % | TEMPERATURE: 99 F | DIASTOLIC BLOOD PRESSURE: 58 MMHG | BODY MASS INDEX: 28.18 KG/M2 | HEART RATE: 74 BPM | RESPIRATION RATE: 18 BRPM | SYSTOLIC BLOOD PRESSURE: 130 MMHG | HEIGHT: 75 IN

## 2018-06-11 PROCEDURE — 80048 BASIC METABOLIC PNL TOTAL CA: CPT | Performed by: INTERNAL MEDICINE

## 2018-06-11 PROCEDURE — 85027 COMPLETE CBC AUTOMATED: CPT | Performed by: ORTHOPAEDIC SURGERY

## 2018-06-11 PROCEDURE — 97530 THERAPEUTIC ACTIVITIES: CPT

## 2018-06-11 PROCEDURE — 83735 ASSAY OF MAGNESIUM: CPT | Performed by: INTERNAL MEDICINE

## 2018-06-11 PROCEDURE — 85610 PROTHROMBIN TIME: CPT | Performed by: HOSPITALIST

## 2018-06-11 RX ORDER — HYDROCODONE BITARTRATE AND ACETAMINOPHEN 5; 325 MG/1; MG/1
1 TABLET ORAL EVERY 4 HOURS PRN
Qty: 30 TABLET | Refills: 0 | Status: ON HOLD | OUTPATIENT
Start: 2018-06-11 | End: 2018-06-18

## 2018-06-11 RX ORDER — MAGNESIUM OXIDE 400 MG (241.3 MG MAGNESIUM) TABLET
400 TABLET ONCE
Status: COMPLETED | OUTPATIENT
Start: 2018-06-11 | End: 2018-06-11

## 2018-06-11 NOTE — PROGRESS NOTES
ASSESSMENT/PLAN:    Impression: 1. Nonsustained wide-complex rhythm in an 80-year-old male post hip fractures. He has no symptoms of palpitations or shortness of breath. He has no history of coronary disease or heart failure.   His EKG shows a trifasc [facility])    • Unspecified essential hypertension        History reviewed. No pertinent family history.    Family History of Premature CAD: No   Social History:  Smoking status: Former Smoker                                                              Packs/da

## 2018-06-11 NOTE — CM/SW NOTE
NATHALIE informed by RN that pt is medically cleared for discharge today at 4:30pm. NATHALIE updated Carolynn Bell at [facility] who confirmed that they can accept pt at this time. Nathalie ordered ambulance transport from [address] [place] [address] for 4:30pm today.  RN updated pt of d/c t

## 2018-06-11 NOTE — OPERATIVE REPORT
[facility]    PATIENT'S NAME: Nerissa Banks   ATTENDING PHYSICIAN: Stephanie Craft MD   OPERATING PHYSICIAN: Lacho Hernandez MD   PATIENT ACCOUNT#:   [de-identified]    LOCATION:  [address] [place] [address] #:   [id number]       DATE OF BI used to repair the tensor fascia joel, 0 Vicryl and 2-0 Vicryl were used to close subcutaneous tissue. Staples were used on the skin. Sterile dressing was applied. The patient was awoken from anesthesia and brought to Recovery in stable condition.     Devante Dale

## 2018-06-11 NOTE — PHYSICAL THERAPY NOTE
PHYSICAL THERAPY TREATMENT NOTE - INPATIENT     Room Number: 424/424-A       Presenting Problem: Right hip pinning due to fracture    Problem List  Principal Problem:    Closed right hip fracture, initial encounter [facility])  Active Problems:    Warfarin antic Sitting: Poor +           Static Standing: Poor -  Dynamic Standing: Poor -    ACTIVITY TOLERANCE  O2 Saturation: 93-94%  Room air    AM-PAC '6-Clicks' INPATIENT SHORT FORM - BASIC MOBILITY  How much difficulty does the patient currently have. ..  -   Pepe Ramirez with mod A x2   Goal #4 Patient verbalizes and/or demonstrates all precautions and safety concerns independently   Goal #4  Current Status  in progress   Goal #5 Patient independently performs home exercise program for ROM/strengthening per the instruction

## 2018-06-11 NOTE — DISCHARGE SUMMARY
[facility] Internal Medicine Discharge Summary   Patient ID:  Delilah Ramirez  [id number]  86 year old  11/10/1928    Admit date: 6/8/2018    Discharge date and time: 6/11/2018     Attending Physician: Marlo Clark MD     Primary Care Physician: Miquel Barrios follow  -NSVT again today, cards notified, cont BB     Anemia, expected  -Hgb 13.9 to 9.6 to 8.8  -check AM cbc, cont to monitor     Hypoxia-->resolved  -likely atelectasis, on 2L, encourage IS     Afib  -BB, tele  -held coumadin for surgery, got Vit K, IN Fluoro >1 Hour  (cpt=76001)    Result Date: 6/9/2018  PROCEDURE: XR C-ARM FLUORO >1 HOUR (CPT=76001)  COMPARISON: None. INDICATIONS: Right hip fracture. TECHNIQUE: Intraoperative fluoroscopy/imaging.        CONCLUSION:   FLUOROSCOPY TIME:   31.1 sec # OF in excellent position and alignment. * Femoral head remains well directed towards the acetabulum. * No obvious complication. * Images were available for review. Dictated by (CST): Shaun Villalta MD on 6/09/2018 at 15:11     Approved by (CST):  Dara understand and agree with therapeutic plan as outlined.  D/w Dr. Edwin Alanis and RN    Parveen Clark MD  [facility] Hospitalist  [phone]  6/11/2018  4:08 PM

## 2018-06-11 NOTE — PROGRESS NOTES
[facility]    Progress Note    Jagjit Naidu Patient Status:  Inpatient    11/10/1928 MRN [id number]   Location [facility] 4W/SW/SE Attending Lalita Valerio MD   Hosp Day # 3 PCP Chary No MD     Sundar

## 2018-06-14 ENCOUNTER — APPOINTMENT (OUTPATIENT)
Dept: GENERAL RADIOLOGY | Facility: HOSPITAL | Age: 83
DRG: 392 | End: 2018-06-14
Attending: EMERGENCY MEDICINE
Payer: MEDICARE

## 2018-06-14 ENCOUNTER — APPOINTMENT (OUTPATIENT)
Dept: CT IMAGING | Facility: HOSPITAL | Age: 83
DRG: 392 | End: 2018-06-14
Attending: NURSE PRACTITIONER
Payer: MEDICARE

## 2018-06-14 ENCOUNTER — HOSPITAL ENCOUNTER (INPATIENT)
Facility: HOSPITAL | Age: 83
LOS: 5 days | Discharge: NURSING, REHAB OR LONG-TERM CARE | DRG: 392 | End: 2018-06-19
Attending: EMERGENCY MEDICINE | Admitting: HOSPITALIST
Payer: MEDICARE

## 2018-06-14 ENCOUNTER — APPOINTMENT (OUTPATIENT)
Dept: CV DIAGNOSTICS | Facility: HOSPITAL | Age: 83
DRG: 392 | End: 2018-06-14
Attending: NURSE PRACTITIONER
Payer: MEDICARE

## 2018-06-14 DIAGNOSIS — R50.9 FEVER, UNSPECIFIED FEVER CAUSE: Primary | ICD-10-CM

## 2018-06-14 DIAGNOSIS — I95.9 HYPOTENSION, UNSPECIFIED HYPOTENSION TYPE: ICD-10-CM

## 2018-06-14 PROBLEM — E87.6 HYPOKALEMIA: Status: ACTIVE | Noted: 2018-06-14

## 2018-06-14 PROBLEM — R79.89 AZOTEMIA: Status: ACTIVE | Noted: 2018-06-14

## 2018-06-14 PROBLEM — D64.9 ANEMIA: Status: ACTIVE | Noted: 2018-06-14

## 2018-06-14 PROBLEM — I47.2 NSVT (NONSUSTAINED VENTRICULAR TACHYCARDIA) (HCC): Status: ACTIVE | Noted: 2018-06-14

## 2018-06-14 PROBLEM — E78.5 HYPERLIPIDEMIA: Status: ACTIVE | Noted: 2018-06-14

## 2018-06-14 PROBLEM — R73.9 HYPERGLYCEMIA: Status: ACTIVE | Noted: 2018-06-14

## 2018-06-14 PROCEDURE — 87040 BLOOD CULTURE FOR BACTERIA: CPT | Performed by: EMERGENCY MEDICINE

## 2018-06-14 PROCEDURE — 84132 ASSAY OF SERUM POTASSIUM: CPT | Performed by: HOSPITALIST

## 2018-06-14 PROCEDURE — 87070 CULTURE OTHR SPECIMN AEROBIC: CPT | Performed by: EMERGENCY MEDICINE

## 2018-06-14 PROCEDURE — 85025 COMPLETE CBC W/AUTO DIFF WBC: CPT | Performed by: EMERGENCY MEDICINE

## 2018-06-14 PROCEDURE — A4216 STERILE WATER/SALINE, 10 ML: HCPCS | Performed by: NURSE PRACTITIONER

## 2018-06-14 PROCEDURE — 96374 THER/PROPH/DIAG INJ IV PUSH: CPT

## 2018-06-14 PROCEDURE — 99285 EMERGENCY DEPT VISIT HI MDM: CPT

## 2018-06-14 PROCEDURE — 84484 ASSAY OF TROPONIN QUANT: CPT | Performed by: NURSE PRACTITIONER

## 2018-06-14 PROCEDURE — 93005 ELECTROCARDIOGRAM TRACING: CPT

## 2018-06-14 PROCEDURE — 96361 HYDRATE IV INFUSION ADD-ON: CPT

## 2018-06-14 PROCEDURE — 93306 TTE W/DOPPLER COMPLETE: CPT | Performed by: NURSE PRACTITIONER

## 2018-06-14 PROCEDURE — 87641 MR-STAPH DNA AMP PROBE: CPT | Performed by: EMERGENCY MEDICINE

## 2018-06-14 PROCEDURE — 83605 ASSAY OF LACTIC ACID: CPT | Performed by: EMERGENCY MEDICINE

## 2018-06-14 PROCEDURE — 83735 ASSAY OF MAGNESIUM: CPT | Performed by: NURSE PRACTITIONER

## 2018-06-14 PROCEDURE — 87493 C DIFF AMPLIFIED PROBE: CPT | Performed by: NURSE PRACTITIONER

## 2018-06-14 PROCEDURE — 71045 X-RAY EXAM CHEST 1 VIEW: CPT | Performed by: EMERGENCY MEDICINE

## 2018-06-14 PROCEDURE — 80048 BASIC METABOLIC PNL TOTAL CA: CPT | Performed by: EMERGENCY MEDICINE

## 2018-06-14 PROCEDURE — 81003 URINALYSIS AUTO W/O SCOPE: CPT | Performed by: EMERGENCY MEDICINE

## 2018-06-14 PROCEDURE — 87205 SMEAR GRAM STAIN: CPT | Performed by: EMERGENCY MEDICINE

## 2018-06-14 PROCEDURE — 93010 ELECTROCARDIOGRAM REPORT: CPT | Performed by: EMERGENCY MEDICINE

## 2018-06-14 PROCEDURE — 87077 CULTURE AEROBIC IDENTIFY: CPT | Performed by: EMERGENCY MEDICINE

## 2018-06-14 PROCEDURE — 85730 THROMBOPLASTIN TIME PARTIAL: CPT | Performed by: EMERGENCY MEDICINE

## 2018-06-14 PROCEDURE — 36415 COLL VENOUS BLD VENIPUNCTURE: CPT

## 2018-06-14 PROCEDURE — 71260 CT THORAX DX C+: CPT | Performed by: NURSE PRACTITIONER

## 2018-06-14 PROCEDURE — 85610 PROTHROMBIN TIME: CPT | Performed by: EMERGENCY MEDICINE

## 2018-06-14 RX ORDER — HYDROCODONE BITARTRATE AND ACETAMINOPHEN 5; 325 MG/1; MG/1
1 TABLET ORAL EVERY 4 HOURS PRN
Status: DISCONTINUED | OUTPATIENT
Start: 2018-06-14 | End: 2018-06-19

## 2018-06-14 RX ORDER — WARFARIN SODIUM 10 MG/1
10 TABLET ORAL NIGHTLY
Status: DISCONTINUED | OUTPATIENT
Start: 2018-06-14 | End: 2018-06-15

## 2018-06-14 RX ORDER — SODIUM CHLORIDE 0.9 % (FLUSH) 0.9 %
3 SYRINGE (ML) INJECTION AS NEEDED
Status: DISCONTINUED | OUTPATIENT
Start: 2018-06-14 | End: 2018-06-19

## 2018-06-14 RX ORDER — MAGNESIUM SULFATE HEPTAHYDRATE 40 MG/ML
2 INJECTION, SOLUTION INTRAVENOUS ONCE
Status: COMPLETED | OUTPATIENT
Start: 2018-06-14 | End: 2018-06-14

## 2018-06-14 RX ORDER — HYDROCODONE BITARTRATE AND ACETAMINOPHEN 5; 325 MG/1; MG/1
2 TABLET ORAL EVERY 4 HOURS PRN
Status: DISCONTINUED | OUTPATIENT
Start: 2018-06-14 | End: 2018-06-19

## 2018-06-14 RX ORDER — FINASTERIDE 5 MG/1
5 TABLET, FILM COATED ORAL
Status: DISCONTINUED | OUTPATIENT
Start: 2018-06-15 | End: 2018-06-19

## 2018-06-14 RX ORDER — POTASSIUM CHLORIDE 20 MEQ/1
40 TABLET, EXTENDED RELEASE ORAL EVERY 4 HOURS
Status: COMPLETED | OUTPATIENT
Start: 2018-06-14 | End: 2018-06-14

## 2018-06-14 RX ORDER — ACETAMINOPHEN 325 MG/1
650 TABLET ORAL EVERY 4 HOURS PRN
Status: DISCONTINUED | OUTPATIENT
Start: 2018-06-14 | End: 2018-06-19

## 2018-06-14 RX ORDER — SODIUM CHLORIDE 9 MG/ML
INJECTION, SOLUTION INTRAVENOUS
Status: DISPENSED
Start: 2018-06-14 | End: 2018-06-15

## 2018-06-14 RX ORDER — HYDROCODONE BITARTRATE AND ACETAMINOPHEN 5; 325 MG/1; MG/1
1 TABLET ORAL ONCE
Status: COMPLETED | OUTPATIENT
Start: 2018-06-14 | End: 2018-06-14

## 2018-06-14 RX ORDER — PRAVASTATIN SODIUM 20 MG
20 TABLET ORAL DAILY
Status: DISCONTINUED | OUTPATIENT
Start: 2018-06-15 | End: 2018-06-19

## 2018-06-14 NOTE — CM/SW NOTE
The pt. Was admitted from [facility] where he was for rehab. Prior to rehab admission [facility] The plan at this time is for the pt.  To return to [facility] when medically stabl

## 2018-06-14 NOTE — ED INITIAL ASSESSMENT (HPI)
Patient from [facility]. Arrived via EMS for feverx3 days and hypotension- at NH per EMS. Recently had right hip replacement. Surgical incision noted to have staples and moderate amount of yellow/red drainage to dressing.

## 2018-06-14 NOTE — ED PROVIDER NOTES
Patient Seen in: [facility] AND [facility] Emergency Department    History   Patient presents with:  Fever (infectious)  Hypotension (cardiovascular)    Stated Complaint:     HPI    Patient presents emergency department after having a fever, \"being out of it\" 1212]  O2 Device: None (Room air) [06/14/18 1050]    Current:/71 (BP Location: Right arm)   Pulse 85   Temp 99.2 °F (37.3 °C) (Oral)   Resp 16   Ht 190.5 cm (6' 3\")   Wt 98.4 kg   SpO2 100%   BMI 27.12 kg/m²         Physical Exam   Constitutional: H 30.9 (*)     RDW 15.5 (*)     Neutrophil Absolute 11.4 (*)     Lymphocyte Absolute 0.7 (*)     Monocyte Absolute 1.4 (*)     All other components within normal limits   LACTIC ACID, PLASMA - Normal   LACTIC ACID 3 HR POST POSITIVE - Normal   TROPONIN I - N Ct Chest Pain/pe (iv Only) Em    Result Date: 6/14/2018  CONCLUSION:   1. No pulmonary embolism. 2. Small pleural effusions with associated atelectasis. 3. Apical predominant emphysema. Bibasilar scarring with bronchiectasis.  Secretions/mucous plug

## 2018-06-14 NOTE — PROGRESS NOTES
ASSESSMENT/PLAN:    Impression: 1. 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 2.  History of atrial fibrillation.   3 history of hip fracture  Recomme Mother       Family History of Premature CAD: No   Social History:  Smoking status: Former Smoker                                                              Packs/day: 0.00      Years: 0.00      Smokeless tobacco: Never Used                      Comment:

## 2018-06-14 NOTE — H&P
[facility] Hospitalist Team  History and Physical     ASSESSMENT / PLAN:   80year old male with PMH sig for afib, DVT/PE, BPH, GERD, OA, HL, recent fall and hip fracture S/p right hip pinning who was D/C 6/11 who now presents with fever, hypotension and liquid s care with Dr. Javy Patel RN, NP  [facility] Hospitalist Team  Pager [phone]  Answering Service number: [phone]  6/14/2018      HISTORY:   CC: Patient presents with:  Fever (infectious)  Hypotension (cardiovascular)       PCP: MARIA ESTHER Mauricio ABDOMEN:  Soft, BS+; nondistended; non tender;no masses;    EXTREMITIES: right hip with dressing with noted yellow serous drainage, venous stasis changes LE, + edema; no cyanosis;, no calf tenderness; peripheral pulses intact     PMH  Past Medical History 1.4*  1.3*   --   1.8*   --        Recent Labs   Lab  06/08/18 2000 06/09/18   0446  06/11/18   0423  06/12/18   1500  06/14/18   1120   NA  132*  136  135*  138  140   K  3.9  4.0  4.0  4.1  3.3   CL  94*  103  106  106  104   CO2  29  27  24  25  27 acute distress, alert and oriented x3, tired  Neck Supple, no JVD  Pulm: Lungs clear, normal respiratory effort, No wheezing or crackles  CV: Heart with regular rate and rhythm, No murmurs, rubs, gallops  Abd: Abdomen soft, nontender, nondistended, no orga

## 2018-06-15 PROCEDURE — 83735 ASSAY OF MAGNESIUM: CPT | Performed by: HOSPITALIST

## 2018-06-15 PROCEDURE — 97110 THERAPEUTIC EXERCISES: CPT

## 2018-06-15 PROCEDURE — 97116 GAIT TRAINING THERAPY: CPT

## 2018-06-15 PROCEDURE — 97166 OT EVAL MOD COMPLEX 45 MIN: CPT

## 2018-06-15 PROCEDURE — 85610 PROTHROMBIN TIME: CPT | Performed by: NURSE PRACTITIONER

## 2018-06-15 PROCEDURE — 97162 PT EVAL MOD COMPLEX 30 MIN: CPT

## 2018-06-15 PROCEDURE — 85025 COMPLETE CBC W/AUTO DIFF WBC: CPT | Performed by: NURSE PRACTITIONER

## 2018-06-15 PROCEDURE — 80048 BASIC METABOLIC PNL TOTAL CA: CPT | Performed by: NURSE PRACTITIONER

## 2018-06-15 RX ORDER — WARFARIN SODIUM 5 MG/1
5 TABLET ORAL NIGHTLY
Status: DISCONTINUED | OUTPATIENT
Start: 2018-06-15 | End: 2018-06-16

## 2018-06-15 NOTE — PHYSICAL THERAPY NOTE
PHYSICAL THERAPY EVALUATION - INPATIENT     Room Number: 344/344-A  Evaluation Date: 6/15/2018  Type of Evaluation: Initial   Physician Order: PT Eval and Treat    Presenting Problem: p/w hypotension, fever, confusion   Reason for Therapy: Mobility Dysfun preparation for discharge. DISCHARGE RECOMMENDATIONS  PT Discharge Recommendations: 24 hour care/supervision;Cont skilled therapy in a supervised setting;Sub-acute rehabilitation    PLAN  PT Treatment Plan: Bed mobility; Body mechanics; Endurance; Energy c recently at sub-acute rehab facility recovering from recent R hip fracture and surgery. Pt required assist for all ADLs; 2 person assist for transfers and very short bout ambulation.  Prior to rehab placement, pt was independent living in [address] [place] [address] perfor hospital room?: A Lot   -   Climbing 3-5 steps with a railing?: Total     AM-PAC Score:  Raw Score: 11   PT Approx Degree of Impairment Score: 72.57%   Standardized Score (AM-PAC Scale): 33.86   CMS Modifier (G-Code): CL    FUNCTIONAL ABILITY STATUS  Gait #6  Current Status

## 2018-06-15 NOTE — CONSULTS
Reason for EP Consultation: Fall vs syncope    Assessment/Plan:     1. Fall vs syncope  2. NSVT  - 8 beats @ 150-160 BPM 6/14/18  - preserved EF  3. Leukocytosis  - some drainage from right hip pinning  4.  DVT  - warfarin  - CT negative for PE    PLAN:  [place] Family History   Problem Relation Age of Onset   • Heart Disease Mother       Past Surgical History:  No date: HIP REPLACEMENT SURGERY  2011: HIP SURGERY Left  No date: HIP SURGERY Right      Comment: S/P IM Nail- Dr Kayli Elise  2-15-12: OTHER SURGICAL HIS Output              251 ml   Net              769 ml       Hemodynamic parameters (last 24 hours):      CONS: well developed, well nourished. HEAD/FACE:no trauma, normocephalic. EYES:conjunctiva not injected, no xanthelasma.    ENT:mucosa pink and mois (cpt=71045)    Result Date: 6/14/2018  CONCLUSION:  1. Markings are less pronounced than on June 8, 2018. 2. The rest of the findings are relatively stable. 3. Borderline cardiomegaly. 4. Atherosclerosis. 5. Scarring/atelectasis. 6. Hyperinflation.  7. Tiff

## 2018-06-15 NOTE — OCCUPATIONAL THERAPY NOTE
OCCUPATIONAL THERAPY EVALUATION - INPATIENT     Room Number: 344/344-A  Evaluation Date: 6/15/2018  Type of Evaluation: Initial  Presenting Problem: Pt with unresponsiveness, near syncope, abnormal rhythm, hypotension, fever, elevated WBC, mild hip drainag Medical History:   Diagnosis Date   • A-fib [facility])    • Cellulitis and abscess of other specified site    • Cognitive communication deficit    • DVT (deep venous thrombosis) (HCC)    • Esophageal reflux    • Hypertrophy of prostate without urinary obstructi word finding at times    Behavioral/Emotional/Social: WFL, agreeable to participate in therapy,     RANGE OF MOTION   Upper extremity ROM is within functional limits     STRENGTH ASSESSMENT  Upper extremity strength is within functional limits     COORDINA

## 2018-06-15 NOTE — PROGRESS NOTES
[facility] Hospitalist Team  Progress Note    Maikel Jurist Patient Status:  Inpatient    11/10/1928 MRN [id number]   Location [facility] 3W/SW Attending Nia Calabrese MD   [facility] Day # 1 PCP Dagoberto Gonzalez MD     CC: Follow Up  PCP: Chris Mei ortho     Hypokalemia/Hypomagnesemia  -repleted via protocol     NSVT/AFib/CMP-EF 45-50%/MR  -echo 2016 with EF 45-50%, moderate MR  -tele-NSVT 6/14, no further episodes  -beta blocker decreased with lower BP  -warfarin dose 10 mg at rehab, per pt was prev 0492  06/11/18   0423  06/12/18   1500  06/14/18   1120  06/14/18   2322  06/15/18   0551   NA  136  135*  138  140   --   140   K  4.0  4.0  4.1  3.3  3.9  4.3   CL  103  106  106  104   --   108   CO2  27  24  25  27   --   27   BUN  14  17  18  21*   -- associated atelectasis. 3. Apical predominant emphysema. Bibasilar scarring with bronchiectasis. Secretions/mucous plugs present in peripheral lower lobe airways. Recommend imaging followup to resolution.   4. 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  Per Dr Roro Spain can keep pt on warfarin for procedure but would like INR to be 2-2.5 to du

## 2018-06-15 NOTE — CONSULTS
[facility]    Report of Consultation    Messi Kelby Patient Status:  Inpatient    11/10/1928 MRN [id number]   Location [facility] 3W/SW Attending Diamond Vail MD   Hosp Day # 1 PCP Aminah Spain MD     Date of (NORCO) 5-325 MG per tab 2 tablet, 2 tablet, Oral, Q4H PRN  •  finasteride (PROSCAR) tab 5 mg, 5 mg, Oral, Daily  •  Pravastatin Sodium (PRAVACHOL) tab 20 mg, 20 mg, Oral, Daily  •  Warfarin Sodium (COUMADIN) tab 10 mg, 10 mg, Oral, Nightly    Review of Sy

## 2018-06-15 NOTE — PROGRESS NOTES
[facility] Note: Antimicrobial Weight Dose Adjustment for: piperacillin/tazobactam (Sophia Sanz)    Ramila Clayton is a 80year old male who has been prescribed piperacillin/tazobactam (ZOSYN) 3.375 gm every 8 hours.   CrCl is estimated creatinine clearance

## 2018-06-16 ENCOUNTER — APPOINTMENT (OUTPATIENT)
Dept: CT IMAGING | Facility: HOSPITAL | Age: 83
DRG: 392 | End: 2018-06-16
Attending: HOSPITALIST
Payer: MEDICARE

## 2018-06-16 PROCEDURE — 85025 COMPLETE CBC W/AUTO DIFF WBC: CPT | Performed by: NURSE PRACTITIONER

## 2018-06-16 PROCEDURE — A4216 STERILE WATER/SALINE, 10 ML: HCPCS | Performed by: NURSE PRACTITIONER

## 2018-06-16 PROCEDURE — 85610 PROTHROMBIN TIME: CPT | Performed by: NURSE PRACTITIONER

## 2018-06-16 PROCEDURE — 81003 URINALYSIS AUTO W/O SCOPE: CPT | Performed by: HOSPITALIST

## 2018-06-16 PROCEDURE — 74177 CT ABD & PELVIS W/CONTRAST: CPT | Performed by: HOSPITALIST

## 2018-06-16 PROCEDURE — 80076 HEPATIC FUNCTION PANEL: CPT | Performed by: HOSPITALIST

## 2018-06-16 PROCEDURE — 80048 BASIC METABOLIC PNL TOTAL CA: CPT | Performed by: NURSE PRACTITIONER

## 2018-06-16 RX ORDER — FUROSEMIDE 40 MG/1
40 TABLET ORAL DAILY
Status: DISCONTINUED | OUTPATIENT
Start: 2018-06-16 | End: 2018-06-18

## 2018-06-16 RX ORDER — ALFUZOSIN HYDROCHLORIDE 10 MG/1
10 TABLET, EXTENDED RELEASE ORAL
Status: DISCONTINUED | OUTPATIENT
Start: 2018-06-17 | End: 2018-06-19

## 2018-06-16 RX ORDER — SODIUM CHLORIDE 9 MG/ML
INJECTION, SOLUTION INTRAVENOUS
Status: COMPLETED
Start: 2018-06-16 | End: 2018-06-16

## 2018-06-16 RX ORDER — SACCHAROMYCES BOULARDII 250 MG
250 CAPSULE ORAL 2 TIMES DAILY
Status: DISCONTINUED | OUTPATIENT
Start: 2018-06-16 | End: 2018-06-19

## 2018-06-16 NOTE — PROGRESS NOTES
[facility] Hospitalist Team  Progress Note    Dia Amos Patient Status:  Inpatient    11/10/1928 MRN [id number]   Location [facility] 3W/SW Attending French Whalen MD   [facility] Day # 2 PCP Jacob Rome MD     CC: Follow Up  PCP: Lachelle Lemus negative.  Per Dr Bethany Valadez can keep pt on warfarin for procedure but would like INR to be 2-2.5 to due procedure safely  -as per EP/Dr Bethany Valadez     Recent R Hip fx S/P Right Hip Pinning  -prn pain meds  -Expected acute blood loss anemia, hgb 10.2 on admission 1500  06/14/18   1120  06/14/18   1124  06/15/18   0551  06/16/18   0549   WBC  13.2*  11.5*  11.2*   --   13.6*  17.3*  16.1*   HGB  9.6*  8.8*  9.7*   --   10.2*  9.8*  9.3*   MCV  94.5  93.9  93.4   --   94.3  94.4  94.5   PLT  124*  111*  159   --   24 June 8, 2018. 2. The rest of the findings are relatively stable. 3. Borderline cardiomegaly. 4. Atherosclerosis. 5. Scarring/atelectasis. 6. Hyperinflation. 7. Demineralization. 8. Scoliosis. 9. Osteoarthritis. Dictated by (CST):  Chelsea Huang MD o

## 2018-06-16 NOTE — PROGRESS NOTES
ASSESSMENT/PLAN:    Impression: 1. 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 Seen by EP. Tele reviewed, no sig bradyarrythmias overnight.    2.  Histo (BP Location: Right arm)   Pulse 60   Temp 97.3 °F (36.3 °C) (Oral)   Resp 18   Ht 6' 3\" (1.905 m)   Wt 224 lb 6.4 oz (101.8 kg)   SpO2 95%   BMI 28.05 kg/m²   CONS: well developed, well nourished. WEIGHT:discussed. HEAD/FACE:no trauma, normocephalic.  EYE

## 2018-06-17 PROCEDURE — 85025 COMPLETE CBC W/AUTO DIFF WBC: CPT | Performed by: HOSPITALIST

## 2018-06-17 PROCEDURE — 83735 ASSAY OF MAGNESIUM: CPT | Performed by: HOSPITALIST

## 2018-06-17 PROCEDURE — 80048 BASIC METABOLIC PNL TOTAL CA: CPT | Performed by: HOSPITALIST

## 2018-06-17 PROCEDURE — 85610 PROTHROMBIN TIME: CPT | Performed by: NURSE PRACTITIONER

## 2018-06-17 RX ORDER — POTASSIUM CHLORIDE 20 MEQ/1
40 TABLET, EXTENDED RELEASE ORAL ONCE
Status: COMPLETED | OUTPATIENT
Start: 2018-06-17 | End: 2018-06-17

## 2018-06-17 RX ORDER — SODIUM CHLORIDE 9 MG/ML
INJECTION, SOLUTION INTRAVENOUS
Status: COMPLETED
Start: 2018-06-17 | End: 2018-06-17

## 2018-06-17 NOTE — PROGRESS NOTES
ASSESSMENT/PLAN:    Impression: 1. 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 Seen by EP. NSVT seen on tele  2. History of atrial fibrillation.   3 hi ROS otherwise negative except as in HPI.   EXAM:   /64 (BP Location: Right arm)   Pulse 76   Temp 98.7 °F (37.1 °C) (Oral)   Resp 18   Ht 6' 3\" (1.905 m)   Wt 225 lb 6.4 oz (102.2 kg)   SpO2 94%   BMI 28.17 kg/m²   CONS: well developed, well nourishe

## 2018-06-17 NOTE — PROGRESS NOTES
[facility] Hospitalist Team  Progress Note    Rosangela Oates Patient Status:  Inpatient    11/10/1928 MRN [id number]   Location [facility] 3W/SW Attending Morteza Barrera MD   Hosp Day # 3 PCP Beverly Langley MD     CC: Follow Up  PCP: Regan Wells as outpt if infectious work up negative.  Per Dr Bethany Valadez can keep pt on warfarin for procedure but would like INR to be 2-2.5 to due procedure safely  -as per EP/Dr Bethany Valadez     Recent R Hip fx S/P Right Hip Pinning  -prn pain meds  -Expected acute blood los 1500  06/14/18   1120  06/14/18   1124  06/15/18   0551  06/16/18   0549  06/17/18   0648   WBC  11.5*  11.2*   --   13.6*  17.3*  16.1*  11.3*   HGB  8.8*  9.7*   --   10.2*  9.8*  9.3*  8.6*   MCV  93.9  93.4   --   94.3  94.4  94.5  94.0   PLT  111*  15 Abdomen+pelvis(contrast Only)(cpt=74177)    Result Date: 6/16/2018  CONCLUSION:  1. Focal relative long segment sigmoid colonic wall thickening with stranding inflammatory stranding.  Although there is distal colonic diverticulosis and these findings may re are new since July, 2000 abdominal CT. These demonstrate nonacute CT features. 13. Moderate anasarca. 14. Lesser incidental findings as above.      Dictated by (CST): Lara Barba MD on 6/16/2018 at 13:58     Approved by (CST): Lara Barba MD on 6/16

## 2018-06-18 PROBLEM — R79.89 AZOTEMIA: Status: RESOLVED | Noted: 2018-06-14 | Resolved: 2018-06-18

## 2018-06-18 PROBLEM — E87.6 HYPOKALEMIA: Status: RESOLVED | Noted: 2018-06-14 | Resolved: 2018-06-18

## 2018-06-18 PROBLEM — R50.9 FEVER: Status: RESOLVED | Noted: 2018-06-14 | Resolved: 2018-06-18

## 2018-06-18 PROBLEM — R73.9 HYPERGLYCEMIA: Status: RESOLVED | Noted: 2018-06-14 | Resolved: 2018-06-18

## 2018-06-18 PROBLEM — I95.9 HYPOTENSION, UNSPECIFIED HYPOTENSION TYPE: Status: RESOLVED | Noted: 2018-06-14 | Resolved: 2018-06-18

## 2018-06-18 PROBLEM — R50.9 FEVER, UNSPECIFIED FEVER CAUSE: Status: RESOLVED | Noted: 2018-06-14 | Resolved: 2018-06-18

## 2018-06-18 PROCEDURE — A4216 STERILE WATER/SALINE, 10 ML: HCPCS | Performed by: NURSE PRACTITIONER

## 2018-06-18 PROCEDURE — 80048 BASIC METABOLIC PNL TOTAL CA: CPT | Performed by: HOSPITALIST

## 2018-06-18 PROCEDURE — 85610 PROTHROMBIN TIME: CPT | Performed by: NURSE PRACTITIONER

## 2018-06-18 PROCEDURE — 97530 THERAPEUTIC ACTIVITIES: CPT

## 2018-06-18 PROCEDURE — 85025 COMPLETE CBC W/AUTO DIFF WBC: CPT | Performed by: HOSPITALIST

## 2018-06-18 PROCEDURE — 84132 ASSAY OF SERUM POTASSIUM: CPT | Performed by: HOSPITALIST

## 2018-06-18 RX ORDER — ALFUZOSIN HYDROCHLORIDE 10 MG/1
10 TABLET, EXTENDED RELEASE ORAL
Qty: 30 TABLET | Refills: 0 | Status: ON HOLD | OUTPATIENT
Start: 2018-06-18 | End: 2019-03-19

## 2018-06-18 RX ORDER — HYDROCODONE BITARTRATE AND ACETAMINOPHEN 5; 325 MG/1; MG/1
1 TABLET ORAL EVERY 4 HOURS PRN
Qty: 5 TABLET | Refills: 0 | Status: ON HOLD | OUTPATIENT
Start: 2018-06-18 | End: 2019-01-21

## 2018-06-18 RX ORDER — SACCHAROMYCES BOULARDII 250 MG
250 CAPSULE ORAL 2 TIMES DAILY
Qty: 12 CAPSULE | Refills: 0 | Status: SHIPPED | OUTPATIENT
Start: 2018-06-18 | End: 2018-06-24

## 2018-06-18 RX ORDER — CASTOR OIL AND BALSAM, PERU 788; 87 MG/G; MG/G
OINTMENT TOPICAL 2 TIMES DAILY PRN
Status: DISCONTINUED | OUTPATIENT
Start: 2018-06-18 | End: 2018-06-19

## 2018-06-18 NOTE — PROGRESS NOTES
[facility] Hospitalist Team  Progress Note    Kalyn Barillas Patient Status:  Inpatient    11/10/1928 MRN [id number]   Location [facility] 3W/SW Attending Aristeo Choi MD   Hosp Day # 4 PCP South Hendricks MD     CC: Follow Up  PCP: MARIA ESTHER Flores Dr Jerome Mcburney, has seen in past     PreSyncope/Syncope with NSVT  -EKG-SR with PAC's, LVH.  Troponin negative  -tele- episodes NSVT 6/14  -echo EF 60%  -CT chest negative for PE  -lopressor  -plans for possible LINQ placement, will discuss timing with colitis/d %.    GENERAL: no apparent distress  NEURO: A/A Ox3  RESP: non labored, CTA  CARDIO: Regular, no murmur  ABD: soft, NT, ND, BS+  EXTREMITIES: right hip with dressing    DIAGNOSTIC DATA:   Labs:     Recent Labs   Lab  06/14/18   1120  06/14/18   1124  06/15 tab 2 tablet 2 tablet Oral Q4H PRN   finasteride (PROSCAR) tab 5 mg 5 mg Oral Daily   Pravastatin Sodium (PRAVACHOL) tab 20 mg 20 mg Oral Daily         IMAGING     Ct Abdomen+pelvis(contrast Only)(cpt=74177)    Result Date: 6/16/2018  CONCLUSION:  1.  Focal internal fixation. Resultant streak artifact limits assessment of the pelvic structures. 12. Age indeterminate moderate L3 and L4 vertebral body compression fractures, which are new since July, 2000 abdominal CT. These demonstrate nonacute CT features.  13. inserted  -UA negative  -continue meds for known BPH-proscar, uroxatrol added  - voiding trial at Rehab   -follow up with Dr Marielena Chinchilla, has seen in past     PreSyncope/Syncope with NSVT  -EKG-SR with PAC's, LVH.  Troponin negative  -tele- episodes NSVT 6/14  -

## 2018-06-18 NOTE — PHYSICAL THERAPY NOTE
PHYSICAL THERAPY TREATMENT NOTE - INPATIENT     Room Number: 344/344-A       Presenting Problem: p/w hypotension, fever, confusion     Problem List  Active Problems:    Anemia    NSVT (nonsustained ventricular tachycardia) (HCC)    Hyperlipidemia      ASSE over in bed (including adjusting bedclothes, sheets and blankets)?: A Lot   -   Sitting down on and standing up from a chair with arms (e.g., wheelchair, bedside commode, etc.): A Lot   -   Moving from lying on back to sitting on the side of the bed?: A Lo

## 2018-06-18 NOTE — PROGRESS NOTES
ASSESSMENT/PLAN:    Impression: 1. 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 2.  History of atrial fibrillation.    3. history of hip fracture  Recom Smokeless tobacco: Never Used                      Comment: very social smoker in 80s  Alcohol use: No                 REVIEW OF SYSTEMS:   CONS:denies fever, chills, significant weight change. CV:see HPI. RESP:denies chronic cough, hemoptysis.  Jordyn Snooks

## 2018-06-18 NOTE — DISCHARGE SUMMARY
[facility] Hospitalist Discharge Summary   Patient ID:  Freedom Gutierres  [id number]  52 year old  11/10/1928    Admit date: 6/14/2018  Discharge date: 6/18/2018    Primary Care Physician: Marylu Alaniz MD   Attending Physician: Elana Antonio MD   Consults associated with apnea. The midlevel states he thought the pt might be having episodes of NSVT, no CPR. He states the pt was having some low grade temps up to 99.5 but he was unaware of temp of 102.  He also was unaware of any loose stools    Hospital Course Rachel Velarde, has seen in past     PreSyncope/Syncope with NSVT  -no issues with pre syncope or syncope during admission  -EKG-SR with PAC's, LVH.  Troponin negative  -tele- episodes NSVT 6/14  -echo EF 60%  -CT chest negative for PE  -lopressor  -plans for possi prior CT angiography of the chest. Please correlate for voluntary or involuntary urinary retention. 3. Mild to moderate bilateral hydroureteronephrosis without visible obstructing etiology.  This may be related to vesicoureteral reflux given marked bladder Tabs  Commonly known as:  PROSCAR  Take 1 tablet (5 mg total) by mouth once daily.      furosemide 40 MG Tabs  Commonly known as:  LASIX  TAKE ONE TABLET BY MOUTH ONCE DAILY     HYDROcodone-acetaminophen 5-325 MG Tabs  Commonly known as:  NORCO  Take 1 tabl rec. Of primary    Coumadin has been on hold due to elevated INR since 6/15. Will resume warfarin tonight at lower dose (5 mg) (was previously on 7.5 mg but increased to 10 mg after OR as got vitamin k) with INR 2.8. INR tomorrow.  Adjust warfarin based on

## 2018-06-18 NOTE — OCCUPATIONAL THERAPY NOTE
OCCUPATIONAL THERAPY TREATMENT NOTE - INPATIENT        Room Number: 344/344-A           Presenting Problem: Pt with unresponsiveness, near syncope, abnormal rhythm, hypotension, fever, elevated WBC, mild hip drainage    Problem List  Active Problems:     An ‘6-Clicks’ Inpatient Daily Activity Short Form  How much help from another person does the patient currently need…  -   Putting on and taking off regular lower body clothing?: A Lot  -   Bathing (including washing, rinsing, drying)?: A Lot  -   Toileting,

## 2018-06-19 VITALS
TEMPERATURE: 98 F | HEIGHT: 75 IN | BODY MASS INDEX: 28.99 KG/M2 | SYSTOLIC BLOOD PRESSURE: 107 MMHG | RESPIRATION RATE: 18 BRPM | HEART RATE: 65 BPM | DIASTOLIC BLOOD PRESSURE: 56 MMHG | WEIGHT: 233.13 LBS | OXYGEN SATURATION: 95 %

## 2018-06-19 PROCEDURE — 85025 COMPLETE CBC W/AUTO DIFF WBC: CPT | Performed by: HOSPITALIST

## 2018-06-19 PROCEDURE — A4216 STERILE WATER/SALINE, 10 ML: HCPCS | Performed by: NURSE PRACTITIONER

## 2018-06-19 PROCEDURE — 99212 OFFICE O/P EST SF 10 MIN: CPT

## 2018-06-19 PROCEDURE — 85610 PROTHROMBIN TIME: CPT | Performed by: HOSPITALIST

## 2018-06-19 PROCEDURE — 80048 BASIC METABOLIC PNL TOTAL CA: CPT | Performed by: HOSPITALIST

## 2018-06-19 RX ORDER — WARFARIN SODIUM 5 MG/1
TABLET ORAL
Qty: 1 TABLET | Refills: 0 | Status: ON HOLD | OUTPATIENT
Start: 2018-06-19 | End: 2019-01-21

## 2018-06-19 RX ORDER — CIPROFLOXACIN 500 MG/1
500 TABLET, FILM COATED ORAL 2 TIMES DAILY
Qty: 12 TABLET | Refills: 0 | Status: SHIPPED | OUTPATIENT
Start: 2018-06-19 | End: 2018-06-25

## 2018-06-19 RX ORDER — METRONIDAZOLE 500 MG/1
500 TABLET ORAL 3 TIMES DAILY
Qty: 18 TABLET | Refills: 0 | Status: SHIPPED | OUTPATIENT
Start: 2018-06-19 | End: 2018-06-25

## 2018-06-19 RX ORDER — CASTOR OIL AND BALSAM, PERU 788; 87 MG/G; MG/G
OINTMENT TOPICAL
Qty: 1 TUBE | Refills: 0 | Status: SHIPPED | OUTPATIENT
Start: 2018-06-19

## 2018-06-19 NOTE — PROGRESS NOTES
Assessment and Plan:     1. Fall vs syncope  2. NSVT  - 8 beats @ 150-160 BPM 6/14/18  - preserved EF  3. Leukocytosis  - some drainage from right hip pinning  4.  DVT  - warfarin  - CT negative for PE     PLAN:  Difficult and inconsistent history from pa

## 2018-06-19 NOTE — CM/SW NOTE
6/19- [address] [place] [address] is able to accept  the Patient  today (6/9) for transfer at 2:00p.m. This Writer informed the Patient's RN of the above.   The Patient requires an ambulance transfer, he is a max assist times 2 and unable to ambulate on his own

## 2018-06-19 NOTE — PLAN OF CARE
Infection Control and Prevention spoke with Dr. Jayleen Menchaca the evening of 6/18/18. GI panel ordered due to continued loose stools. Patient was tested on admission (6/14) and was negative for C. Diff.   Dr. Jayleen Menchaca advised if loose stools test via GI panel to rule

## 2018-06-19 NOTE — CM/SW NOTE
Explanation of of BPCI/Medicare program provided. Patient was enrolled under . Patient/family agreed to phone f/u for 3 months from [address] after discharge from [address] [place] [address]. BPCI/Medicare letter and brochure provided.   Plan- Tan Jacobs RN,

## 2018-06-19 NOTE — PLAN OF CARE
GASTROINTESTINAL - ADULT    • Maintains or returns to baseline bowel function Not Progressing          CARDIOVASCULAR - ADULT    • Maintains optimal cardiac output and hemodynamic stability Progressing    • Absence of cardiac arrhythmias or at baseline Pro

## 2018-07-03 ENCOUNTER — TELEPHONE (OUTPATIENT)
Dept: SURGERY | Facility: CLINIC | Age: 83
End: 2018-07-03

## 2018-07-03 NOTE — TELEPHONE ENCOUNTER
Dr. Edd Anglin, pt [address] [facility] 8/31/17 with you. We received a fax from [address] [place] [address] on an ultrasound. This was ordered by a [facility] 6/27/18. I placed the results in scanning and one on Dr. Edd Anglin desk.

## 2018-07-06 ENCOUNTER — OFFICE VISIT (OUTPATIENT)
Dept: CARDIOLOGY CLINIC | Facility: HOSPITAL | Age: 83
End: 2018-07-06
Attending: INTERNAL MEDICINE
Payer: MEDICARE

## 2018-07-06 VITALS — DIASTOLIC BLOOD PRESSURE: 59 MMHG | SYSTOLIC BLOOD PRESSURE: 92 MMHG | HEART RATE: 59 BPM

## 2018-07-06 DIAGNOSIS — R60.0 BILATERAL LOWER EXTREMITY EDEMA: Primary | ICD-10-CM

## 2018-07-06 DIAGNOSIS — I48.0 AF (PAROXYSMAL ATRIAL FIBRILLATION) (HCC): ICD-10-CM

## 2018-07-06 DIAGNOSIS — I48.91 ATRIAL FIBRILLATION (HCC): ICD-10-CM

## 2018-07-06 DIAGNOSIS — R60.0 EDEMA OF BOTH UPPER EXTREMITIES: ICD-10-CM

## 2018-07-06 DIAGNOSIS — I47.2 NSVT (NONSUSTAINED VENTRICULAR TACHYCARDIA) (HCC): ICD-10-CM

## 2018-07-06 LAB
ANION GAP SERPL CALC-SCNC: 3 MMOL/L (ref 0–18)
BASOPHILS # BLD: 0.1 K/UL (ref 0–0.2)
BASOPHILS NFR BLD: 1 %
BUN SERPL-MCNC: 17 MG/DL (ref 8–20)
BUN/CREAT SERPL: 25.8 (ref 10–20)
CALCIUM SERPL-MCNC: 8.2 MG/DL (ref 8.5–10.5)
CHLORIDE SERPL-SCNC: 104 MMOL/L (ref 95–110)
CO2 SERPL-SCNC: 32 MMOL/L (ref 22–32)
CREAT SERPL-MCNC: 0.66 MG/DL (ref 0.5–1.5)
EOSINOPHIL # BLD: 0.2 K/UL (ref 0–0.7)
EOSINOPHIL NFR BLD: 3 %
ERYTHROCYTE [DISTWIDTH] IN BLOOD BY AUTOMATED COUNT: 17 % (ref 11–15)
GLUCOSE SERPL-MCNC: 100 MG/DL (ref 70–99)
HCT VFR BLD AUTO: 32.5 % (ref 41–52)
HGB BLD-MCNC: 10.7 G/DL (ref 13.5–17.5)
INR BLD: 1.9 (ref 0.9–1.2)
LYMPHOCYTES # BLD: 1.2 K/UL (ref 1–4)
LYMPHOCYTES NFR BLD: 17 %
MCH RBC QN AUTO: 31.1 PG (ref 27–32)
MCHC RBC AUTO-ENTMCNC: 32.8 G/DL (ref 32–37)
MCV RBC AUTO: 94.8 FL (ref 80–100)
MONOCYTES # BLD: 0.7 K/UL (ref 0–1)
MONOCYTES NFR BLD: 10 %
NEUTROPHILS # BLD AUTO: 4.8 K/UL (ref 1.8–7.7)
NEUTROPHILS NFR BLD: 69 %
OSMOLALITY UR CALC.SUM OF ELEC: 290 MOSM/KG (ref 275–295)
PLATELET # BLD AUTO: 364 K/UL (ref 140–400)
PMV BLD AUTO: 6.7 FL (ref 7.4–10.3)
POTASSIUM SERPL-SCNC: 4 MMOL/L (ref 3.3–5.1)
PROTHROMBIN TIME: 21.2 SECONDS (ref 11.8–14.5)
RBC # BLD AUTO: 3.43 M/UL (ref 4.5–5.9)
SODIUM SERPL-SCNC: 139 MMOL/L (ref 136–144)
WBC # BLD AUTO: 6.9 K/UL (ref 4–11)

## 2018-07-06 PROCEDURE — 99211 OFF/OP EST MAY X REQ PHY/QHP: CPT | Performed by: NURSE PRACTITIONER

## 2018-07-06 PROCEDURE — 80048 BASIC METABOLIC PNL TOTAL CA: CPT | Performed by: NURSE PRACTITIONER

## 2018-07-06 PROCEDURE — 99214 OFFICE O/P EST MOD 30 MIN: CPT | Performed by: NURSE PRACTITIONER

## 2018-07-06 PROCEDURE — 93010 ELECTROCARDIOGRAM REPORT: CPT | Performed by: NURSE PRACTITIONER

## 2018-07-06 PROCEDURE — 96374 THER/PROPH/DIAG INJ IV PUSH: CPT | Performed by: NURSE PRACTITIONER

## 2018-07-06 PROCEDURE — 85025 COMPLETE CBC W/AUTO DIFF WBC: CPT | Performed by: NURSE PRACTITIONER

## 2018-07-06 PROCEDURE — 36415 COLL VENOUS BLD VENIPUNCTURE: CPT | Performed by: NURSE PRACTITIONER

## 2018-07-06 PROCEDURE — 85610 PROTHROMBIN TIME: CPT | Performed by: NURSE PRACTITIONER

## 2018-07-06 PROCEDURE — 93005 ELECTROCARDIOGRAM TRACING: CPT

## 2018-07-06 RX ORDER — DOCUSATE SODIUM 100 MG/1
100 CAPSULE, LIQUID FILLED ORAL DAILY
COMMUNITY
End: 2020-01-01

## 2018-07-06 RX ORDER — SENNOSIDES 8.6 MG
8.6 TABLET ORAL 2 TIMES DAILY
Status: ON HOLD | COMMUNITY
End: 2019-03-19

## 2018-07-06 RX ORDER — GABAPENTIN 100 MG/1
300 CAPSULE ORAL 3 TIMES DAILY
Status: ON HOLD | COMMUNITY
End: 2019-07-08

## 2018-07-06 RX ORDER — FUROSEMIDE 10 MG/ML
INJECTION INTRAMUSCULAR; INTRAVENOUS
Status: DISCONTINUED
Start: 2018-07-06 | End: 2018-07-06

## 2018-07-06 NOTE — PROGRESS NOTES
[address] Patient Status:  Outpatient    11/10/1928 MRN [id number]   Location Kristen Kilgore MD         Yury Jefferson is a 80year old male who presents to clinic for a BILT 1.2 06/16/2018 05:49 AM   TP 5.1 (L) 06/16/2018 05:49 AM   AST 25 06/16/2018 05:49 AM   ALT 15 (L) 06/16/2018 05:49 AM   PTT 36.5 (H) 06/14/2018 11:20 AM   INR 2.8 (H) 06/19/2018 05:23 AM   PTP 28.7 (H) 06/19/2018 05:23 AM   TSH 2.031 11/28/2017 11: purpose of clinic visits, sodium restricted diet, low sodium foods, fluid restrictions, daily weights, medication regimen s/s heart failure exacerbation and when to call APN/clinic. Patient and family receptive.       Assessment:  Afib   EKG NSR with PACs chest pain, shortness of breath, coughing, swelling, weight gain or worsening symptoms. 32–50 ounces of fluid daily    Less than 2000 mg salt/sodium daily.  Common high sodium foods include frozen dinners, soups (not homemade), some cereal, vegetable ju

## 2018-07-06 NOTE — PATIENT INSTRUCTIONS
Increase furosemide to 40 mg twice daily for 4 days, then furosemide 40 mg daily    Repeat BMP in 1 week    Return to heart failure clinic 7/19/18    Track daily weights    Please call the heart failure clinic if you notice a weight gain of 3 pounds overni or fatigue.  Stop exercise if you develop chest pain, lightheadedness, or significant shortness of breath

## 2018-07-08 NOTE — TELEPHONE ENCOUNTER
Renal US ordered by DR. Flores, a [facility] PCP. Please mke sure her office is aware and have a copy.   Thanks

## 2018-07-19 ENCOUNTER — OFFICE VISIT (OUTPATIENT)
Dept: CARDIOLOGY CLINIC | Facility: HOSPITAL | Age: 83
End: 2018-07-19
Attending: INTERNAL MEDICINE
Payer: MEDICARE

## 2018-07-19 VITALS
BODY MASS INDEX: 28 KG/M2 | SYSTOLIC BLOOD PRESSURE: 115 MMHG | HEART RATE: 68 BPM | DIASTOLIC BLOOD PRESSURE: 68 MMHG | WEIGHT: 223 LBS | OXYGEN SATURATION: 96 %

## 2018-07-19 DIAGNOSIS — I50.9 HEART FAILURE, UNSPECIFIED (HCC): ICD-10-CM

## 2018-07-19 DIAGNOSIS — I48.0 AF (PAROXYSMAL ATRIAL FIBRILLATION) (HCC): Primary | ICD-10-CM

## 2018-07-19 DIAGNOSIS — R60.0 BILATERAL LOWER EXTREMITY EDEMA: ICD-10-CM

## 2018-07-19 LAB
ANION GAP SERPL CALC-SCNC: 9 MMOL/L (ref 0–18)
BUN SERPL-MCNC: 24 MG/DL (ref 8–20)
BUN/CREAT SERPL: 31.6 (ref 10–20)
CALCIUM SERPL-MCNC: 8.4 MG/DL (ref 8.5–10.5)
CHLORIDE SERPL-SCNC: 99 MMOL/L (ref 95–110)
CO2 SERPL-SCNC: 30 MMOL/L (ref 22–32)
CREAT SERPL-MCNC: 0.76 MG/DL (ref 0.5–1.5)
GLUCOSE SERPL-MCNC: 132 MG/DL (ref 70–99)
OSMOLALITY UR CALC.SUM OF ELEC: 292 MOSM/KG (ref 275–295)
POTASSIUM SERPL-SCNC: 3.3 MMOL/L (ref 3.3–5.1)
SODIUM SERPL-SCNC: 138 MMOL/L (ref 136–144)

## 2018-07-19 PROCEDURE — 99213 OFFICE O/P EST LOW 20 MIN: CPT | Performed by: NURSE PRACTITIONER

## 2018-07-19 PROCEDURE — 99211 OFF/OP EST MAY X REQ PHY/QHP: CPT | Performed by: NURSE PRACTITIONER

## 2018-07-19 PROCEDURE — 80048 BASIC METABOLIC PNL TOTAL CA: CPT | Performed by: NURSE PRACTITIONER

## 2018-07-19 PROCEDURE — 36415 COLL VENOUS BLD VENIPUNCTURE: CPT | Performed by: NURSE PRACTITIONER

## 2018-07-19 RX ORDER — ARIPIPRAZOLE 15 MG/1
TABLET ORAL
Status: DISCONTINUED
Start: 2018-07-19 | End: 2018-07-19

## 2018-07-19 RX ORDER — ALLOPURINOL 100 MG/1
100 TABLET ORAL 2 TIMES DAILY
COMMUNITY

## 2018-07-19 RX ORDER — CEPHALEXIN 500 MG/1
500 CAPSULE ORAL 2 TIMES DAILY
Status: ON HOLD | COMMUNITY
End: 2019-01-21

## 2018-07-19 RX ORDER — PREDNISONE 20 MG/1
20 TABLET ORAL DAILY
Status: ON HOLD | COMMUNITY
End: 2019-03-19

## 2018-07-19 RX ORDER — COLCHICINE 0.6 MG/1
0.6 TABLET ORAL 2 TIMES DAILY
COMMUNITY

## 2018-07-19 NOTE — PROGRESS NOTES
[address] Patient Status:  Outpatient    11/10/1928 MRN [id number]   Location José Manuel Tariq MD         Fiona Rivas is a 80year old male who presents to clinic for a  07/06/2018 12:28 PM   CO2 32 07/06/2018 12:28 PM    (H) 07/06/2018 12:28 PM   CA 8.2 (L) 07/06/2018 12:28 PM   ALB 2.3 (L) 06/16/2018 05:49 AM   ALKPHO 90 06/16/2018 05:49 AM   BILT 1.2 06/16/2018 05:49 AM   TP 5.1 (L) 06/16/2018 05:49 AM (CST): Jennie Uribe MD on 6/14/2018 at 16:22       Approved by (CST): Jennie Uribe MD on 6/14/2018 at 16:33           Education:  Patient and family instructed at length regarding clinic procedures, hours, purpose of clinic visits, sodium restric to heart failure clinic as needed or directed    Track daily weights    Please call the heart failure clinic if you notice a weight gain of 3 pounds overnight or 5 pounds or more in 5 days.      Monitor yourself for signs and symptoms of fluid overload, in breath      I spent greater than 40 minutes with this patient providing counseling, coordination of care and education related specifically to heart failure.       Monisha Alvarez, STEVE  7/19/18

## 2018-07-19 NOTE — PATIENT INSTRUCTIONS
Add  Potassium Chloride 20 meq daily     Continue current medications    Return to heart failure clinic as needed or directed    Track daily weights    Please call the heart failure clinic if you notice a weight gain of 3 pounds overnight or 5 pounds or mo exercise if you develop chest pain, lightheadedness, or significant shortness of breath

## 2019-01-18 ENCOUNTER — APPOINTMENT (OUTPATIENT)
Dept: CT IMAGING | Facility: HOSPITAL | Age: 84
DRG: 728 | End: 2019-01-18
Attending: EMERGENCY MEDICINE
Payer: MEDICARE

## 2019-01-18 ENCOUNTER — HOSPITAL ENCOUNTER (INPATIENT)
Facility: HOSPITAL | Age: 84
LOS: 3 days | Discharge: NURSING, REHAB OR LONG-TERM CARE | DRG: 728 | End: 2019-01-21
Attending: EMERGENCY MEDICINE | Admitting: HOSPITALIST
Payer: MEDICARE

## 2019-01-18 DIAGNOSIS — N49.2 CELLULITIS OF SCROTUM: Primary | ICD-10-CM

## 2019-01-18 LAB
ALBUMIN SERPL BCP-MCNC: 2.5 G/DL (ref 3.5–4.8)
ALP SERPL-CCNC: 136 U/L (ref 32–100)
ALT SERPL-CCNC: 22 U/L (ref 17–63)
ANION GAP SERPL CALC-SCNC: 14 MMOL/L (ref 0–18)
ANTIBODY SCREEN: NEGATIVE
APTT PPP: 43 SECONDS (ref 23.2–35.3)
AST SERPL-CCNC: 30 U/L (ref 15–41)
BASOPHILS # BLD: 0.1 K/UL (ref 0–0.2)
BASOPHILS NFR BLD: 0 %
BILIRUB DIRECT SERPL-MCNC: 0.2 MG/DL (ref 0–0.2)
BILIRUB SERPL-MCNC: 0.6 MG/DL (ref 0.3–1.2)
BUN SERPL-MCNC: 24 MG/DL (ref 8–20)
BUN/CREAT SERPL: 26.4 (ref 10–20)
CALCIUM SERPL-MCNC: 8.7 MG/DL (ref 8.5–10.5)
CHLORIDE SERPL-SCNC: 100 MMOL/L (ref 95–110)
CO2 SERPL-SCNC: 24 MMOL/L (ref 22–32)
CREAT SERPL-MCNC: 0.91 MG/DL (ref 0.5–1.5)
EOSINOPHIL # BLD: 0.3 K/UL (ref 0–0.7)
EOSINOPHIL NFR BLD: 2 %
ERYTHROCYTE [DISTWIDTH] IN BLOOD BY AUTOMATED COUNT: 16.1 % (ref 11–15)
GLUCOSE SERPL-MCNC: 97 MG/DL (ref 70–99)
HCT VFR BLD AUTO: 39.7 % (ref 41–52)
HGB BLD-MCNC: 13 G/DL (ref 13.5–17.5)
INR BLD: 1.2 (ref 0.9–1.2)
LYMPHOCYTES # BLD: 1.9 K/UL (ref 1–4)
LYMPHOCYTES NFR BLD: 13 %
MCH RBC QN AUTO: 31.6 PG (ref 27–32)
MCHC RBC AUTO-ENTMCNC: 32.6 G/DL (ref 32–37)
MCV RBC AUTO: 96.7 FL (ref 80–100)
MONOCYTES # BLD: 1 K/UL (ref 0–1)
MONOCYTES NFR BLD: 7 %
MRSA DNA SPEC QL NAA+PROBE: POSITIVE
NEUTROPHILS # BLD AUTO: 10.8 K/UL (ref 1.8–7.7)
NEUTROPHILS NFR BLD: 77 %
OSMOLALITY UR CALC.SUM OF ELEC: 290 MOSM/KG (ref 275–295)
PLATELET # BLD AUTO: 327 K/UL (ref 140–400)
PMV BLD AUTO: 7.8 FL (ref 7.4–10.3)
POTASSIUM SERPL-SCNC: 4.1 MMOL/L (ref 3.3–5.1)
PROT SERPL-MCNC: 6.2 G/DL (ref 5.9–8.4)
PROTHROMBIN TIME: 14.4 SECONDS (ref 11.8–14.5)
RBC # BLD AUTO: 4.11 M/UL (ref 4.5–5.9)
RH BLOOD TYPE: POSITIVE
SODIUM SERPL-SCNC: 138 MMOL/L (ref 136–144)
WBC # BLD AUTO: 14 K/UL (ref 4–11)

## 2019-01-18 PROCEDURE — 86901 BLOOD TYPING SEROLOGIC RH(D): CPT | Performed by: EMERGENCY MEDICINE

## 2019-01-18 PROCEDURE — 72193 CT PELVIS W/DYE: CPT | Performed by: EMERGENCY MEDICINE

## 2019-01-18 PROCEDURE — 85025 COMPLETE CBC W/AUTO DIFF WBC: CPT | Performed by: EMERGENCY MEDICINE

## 2019-01-18 PROCEDURE — 87040 BLOOD CULTURE FOR BACTERIA: CPT | Performed by: EMERGENCY MEDICINE

## 2019-01-18 PROCEDURE — 99285 EMERGENCY DEPT VISIT HI MDM: CPT

## 2019-01-18 PROCEDURE — 87077 CULTURE AEROBIC IDENTIFY: CPT | Performed by: EMERGENCY MEDICINE

## 2019-01-18 PROCEDURE — 86850 RBC ANTIBODY SCREEN: CPT | Performed by: EMERGENCY MEDICINE

## 2019-01-18 PROCEDURE — 87186 SC STD MICRODIL/AGAR DIL: CPT | Performed by: EMERGENCY MEDICINE

## 2019-01-18 PROCEDURE — 86900 BLOOD TYPING SEROLOGIC ABO: CPT | Performed by: EMERGENCY MEDICINE

## 2019-01-18 PROCEDURE — 96365 THER/PROPH/DIAG IV INF INIT: CPT

## 2019-01-18 PROCEDURE — 80076 HEPATIC FUNCTION PANEL: CPT | Performed by: EMERGENCY MEDICINE

## 2019-01-18 PROCEDURE — C9113 INJ PANTOPRAZOLE SODIUM, VIA: HCPCS | Performed by: EMERGENCY MEDICINE

## 2019-01-18 PROCEDURE — 87205 SMEAR GRAM STAIN: CPT | Performed by: EMERGENCY MEDICINE

## 2019-01-18 PROCEDURE — 96375 TX/PRO/DX INJ NEW DRUG ADDON: CPT

## 2019-01-18 PROCEDURE — 80048 BASIC METABOLIC PNL TOTAL CA: CPT | Performed by: EMERGENCY MEDICINE

## 2019-01-18 PROCEDURE — 85730 THROMBOPLASTIN TIME PARTIAL: CPT | Performed by: EMERGENCY MEDICINE

## 2019-01-18 PROCEDURE — 87070 CULTURE OTHR SPECIMN AEROBIC: CPT | Performed by: EMERGENCY MEDICINE

## 2019-01-18 PROCEDURE — 87641 MR-STAPH DNA AMP PROBE: CPT | Performed by: EMERGENCY MEDICINE

## 2019-01-18 PROCEDURE — 85610 PROTHROMBIN TIME: CPT | Performed by: EMERGENCY MEDICINE

## 2019-01-18 RX ORDER — ONDANSETRON 2 MG/ML
4 INJECTION INTRAMUSCULAR; INTRAVENOUS EVERY 6 HOURS PRN
Status: DISCONTINUED | OUTPATIENT
Start: 2019-01-18 | End: 2019-01-21

## 2019-01-18 RX ORDER — MORPHINE SULFATE 4 MG/ML
4 INJECTION, SOLUTION INTRAMUSCULAR; INTRAVENOUS EVERY 2 HOUR PRN
Status: DISCONTINUED | OUTPATIENT
Start: 2019-01-18 | End: 2019-01-21

## 2019-01-18 RX ORDER — MORPHINE SULFATE 2 MG/ML
2 INJECTION, SOLUTION INTRAMUSCULAR; INTRAVENOUS EVERY 2 HOUR PRN
Status: DISCONTINUED | OUTPATIENT
Start: 2019-01-18 | End: 2019-01-21

## 2019-01-18 RX ORDER — BISACODYL 10 MG
10 SUPPOSITORY, RECTAL RECTAL
Status: DISCONTINUED | OUTPATIENT
Start: 2019-01-18 | End: 2019-01-21

## 2019-01-18 RX ORDER — METOCLOPRAMIDE HYDROCHLORIDE 5 MG/ML
10 INJECTION INTRAMUSCULAR; INTRAVENOUS EVERY 8 HOURS PRN
Status: DISCONTINUED | OUTPATIENT
Start: 2019-01-18 | End: 2019-01-21

## 2019-01-18 RX ORDER — SODIUM CHLORIDE 0.9 % (FLUSH) 0.9 %
3 SYRINGE (ML) INJECTION AS NEEDED
Status: DISCONTINUED | OUTPATIENT
Start: 2019-01-18 | End: 2019-01-21

## 2019-01-18 RX ORDER — MORPHINE SULFATE 2 MG/ML
1 INJECTION, SOLUTION INTRAMUSCULAR; INTRAVENOUS EVERY 2 HOUR PRN
Status: DISCONTINUED | OUTPATIENT
Start: 2019-01-18 | End: 2019-01-21

## 2019-01-18 RX ORDER — HYDROCODONE BITARTRATE AND ACETAMINOPHEN 5; 325 MG/1; MG/1
2 TABLET ORAL EVERY 4 HOURS PRN
Status: DISCONTINUED | OUTPATIENT
Start: 2019-01-18 | End: 2019-01-21

## 2019-01-18 RX ORDER — ACETAMINOPHEN 325 MG/1
650 TABLET ORAL EVERY 4 HOURS PRN
Status: DISCONTINUED | OUTPATIENT
Start: 2019-01-18 | End: 2019-01-21

## 2019-01-18 RX ORDER — HYDROCODONE BITARTRATE AND ACETAMINOPHEN 5; 325 MG/1; MG/1
1 TABLET ORAL EVERY 4 HOURS PRN
Status: DISCONTINUED | OUTPATIENT
Start: 2019-01-18 | End: 2019-01-21

## 2019-01-18 RX ORDER — POLYETHYLENE GLYCOL 3350 17 G/17G
17 POWDER, FOR SOLUTION ORAL DAILY PRN
Status: DISCONTINUED | OUTPATIENT
Start: 2019-01-18 | End: 2019-01-21

## 2019-01-18 NOTE — ED NOTES
MD at bedside for rectal exam. Upon exam, MD reporting open, draining wound to L posterior aspect of the scrotum. Wound visualized by this RN. New orders pending.

## 2019-01-18 NOTE — ED NOTES
Care assumed from EMS. Pt presents with c/o rectal bleeding, onset this am. Pt reports the blood started bright red and now has slowed to a dark brown. Pt denies n/v/d. On eliquis for HX afib. Skin color warm, dry, appropriate color for ethnicity.  Brisk ca

## 2019-01-18 NOTE — ED PROVIDER NOTES
Patient Seen in: [facility] AND [facility] Emergency Department    History   Patient presents with:  Bleeding (hematologic)    Stated Complaint:     HPI    80-year-old male resident of [facility] with a history of hemorrhoidal bleeding who had 2 episodes of what 61   Temp 98.2 °F (36.8 °C) (Oral)   Resp 20   Ht 177.8 cm (5' 10\")   Wt 101.2 kg   SpO2 100%   BMI 32.01 kg/m²         Physical Exam    Constitutional: Oriented to person, place, and time. Appears well-developed. No distress.    Head: Normocephalic and a does not necessarily indicate the presence of viable organisms. For confirmation, epidemiological typing and susceptibility testing, appropriate culture is needed. PLEASE REFER TO MRSA SCREENING PROTOCOL FOR TREATMENT.    CBC W/ DIFFERENTIAL - Abnormal; 13:41. [facility], XR HIP W OR WO PELVIS 2 OR 3 VIEWS, RIGHT (CPT=73502), 6/08/2018, 18:54. [facility], XR HIP W OR WO PELVIS 2 OR 3 VIEWS, RIGHT (CPT=73502), 6/09/2018, 12:54.   [facility], XR C-ARM FLUORO visualized. Multilevel degenerative changes of the lumbar spine are visualized, particularly at the lumbosacral junction, where there is vacuum disc phenomenon.   Postoperative changes of previous right hip dynamic screw placement and lateral fixation plate compromising visualization of the pelvic structures. 3. Horton balloon is partially visualized. 4. Nonobstructing left renal calculus. 5. Probable renal cysts, partially delineated.   6. Colonic diverticulosis without CT evidence of acute complication trina

## 2019-01-18 NOTE — ED INITIAL ASSESSMENT (HPI)
Pt presents from [facility] with c/o bleeding from rectum, onset this am. Pt on eliquis.

## 2019-01-19 LAB
ANION GAP SERPL CALC-SCNC: 7 MMOL/L (ref 0–18)
BASOPHILS # BLD: 0.1 K/UL (ref 0–0.2)
BASOPHILS NFR BLD: 1 %
BUN SERPL-MCNC: 19 MG/DL (ref 8–20)
BUN/CREAT SERPL: 21.8 (ref 10–20)
CALCIUM SERPL-MCNC: 8.3 MG/DL (ref 8.5–10.5)
CHLORIDE SERPL-SCNC: 106 MMOL/L (ref 95–110)
CO2 SERPL-SCNC: 26 MMOL/L (ref 22–32)
CREAT SERPL-MCNC: 0.87 MG/DL (ref 0.5–1.5)
EOSINOPHIL # BLD: 0.6 K/UL (ref 0–0.7)
EOSINOPHIL NFR BLD: 6 %
ERYTHROCYTE [DISTWIDTH] IN BLOOD BY AUTOMATED COUNT: 15.6 % (ref 11–15)
GLUCOSE SERPL-MCNC: 103 MG/DL (ref 70–99)
HCT VFR BLD AUTO: 35.1 % (ref 41–52)
HGB BLD-MCNC: 11.6 G/DL (ref 13.5–17.5)
LYMPHOCYTES # BLD: 1.3 K/UL (ref 1–4)
LYMPHOCYTES NFR BLD: 12 %
MCH RBC QN AUTO: 31.8 PG (ref 27–32)
MCHC RBC AUTO-ENTMCNC: 33 G/DL (ref 32–37)
MCV RBC AUTO: 96.2 FL (ref 80–100)
MONOCYTES # BLD: 0.9 K/UL (ref 0–1)
MONOCYTES NFR BLD: 8 %
NEUTROPHILS # BLD AUTO: 8 K/UL (ref 1.8–7.7)
NEUTROPHILS NFR BLD: 74 %
OSMOLALITY UR CALC.SUM OF ELEC: 291 MOSM/KG (ref 275–295)
PLATELET # BLD AUTO: 309 K/UL (ref 140–400)
PMV BLD AUTO: 7.3 FL (ref 7.4–10.3)
POTASSIUM SERPL-SCNC: 3.7 MMOL/L (ref 3.3–5.1)
RBC # BLD AUTO: 3.65 M/UL (ref 4.5–5.9)
SODIUM SERPL-SCNC: 139 MMOL/L (ref 136–144)
WBC # BLD AUTO: 10.9 K/UL (ref 4–11)

## 2019-01-19 PROCEDURE — 0V95XZZ DRAINAGE OF SCROTUM, EXTERNAL APPROACH: ICD-10-PCS | Performed by: UROLOGY

## 2019-01-19 PROCEDURE — 97530 THERAPEUTIC ACTIVITIES: CPT

## 2019-01-19 PROCEDURE — 97166 OT EVAL MOD COMPLEX 45 MIN: CPT

## 2019-01-19 PROCEDURE — 97162 PT EVAL MOD COMPLEX 30 MIN: CPT

## 2019-01-19 PROCEDURE — 85025 COMPLETE CBC W/AUTO DIFF WBC: CPT | Performed by: HOSPITALIST

## 2019-01-19 PROCEDURE — 80048 BASIC METABOLIC PNL TOTAL CA: CPT | Performed by: HOSPITALIST

## 2019-01-19 RX ORDER — ALLOPURINOL 100 MG/1
100 TABLET ORAL DAILY
Status: DISCONTINUED | OUTPATIENT
Start: 2019-01-20 | End: 2019-01-21

## 2019-01-19 RX ORDER — SENNOSIDES 8.6 MG
8.6 TABLET ORAL 2 TIMES DAILY
Status: DISCONTINUED | OUTPATIENT
Start: 2019-01-19 | End: 2019-01-21

## 2019-01-19 RX ORDER — POTASSIUM CHLORIDE 14.9 MG/ML
20 INJECTION INTRAVENOUS ONCE
Status: COMPLETED | OUTPATIENT
Start: 2019-01-19 | End: 2019-01-19

## 2019-01-19 RX ORDER — BUMETANIDE 1 MG/1
1 TABLET ORAL DAILY
Status: DISCONTINUED | OUTPATIENT
Start: 2019-01-20 | End: 2019-01-21

## 2019-01-19 RX ORDER — ALFUZOSIN HYDROCHLORIDE 10 MG/1
10 TABLET, EXTENDED RELEASE ORAL
Status: DISCONTINUED | OUTPATIENT
Start: 2019-01-20 | End: 2019-01-21

## 2019-01-19 RX ORDER — GLUCOSAMINE SULFATE 500 MG
1 CAPSULE ORAL DAILY
Status: ON HOLD | COMMUNITY
End: 2019-03-19

## 2019-01-19 RX ORDER — DOCUSATE SODIUM 100 MG/1
100 CAPSULE, LIQUID FILLED ORAL DAILY
Status: DISCONTINUED | OUTPATIENT
Start: 2019-01-20 | End: 2019-01-21

## 2019-01-19 RX ORDER — POTASSIUM CHLORIDE 1.5 G/1.77G
20 POWDER, FOR SOLUTION ORAL 2 TIMES DAILY
COMMUNITY
End: 2020-01-01

## 2019-01-19 RX ORDER — L-METHYLFOLATE-ALGAE-VIT B12-B6 CAP 3-90.314-2-35 MG 3-90.314-2-35 MG
1 CAP ORAL 2 TIMES DAILY
Status: ON HOLD | COMMUNITY
End: 2019-07-04

## 2019-01-19 RX ORDER — ALENDRONATE SODIUM 70 MG/1
70 TABLET ORAL WEEKLY
Status: ON HOLD | COMMUNITY
End: 2019-03-19

## 2019-01-19 RX ORDER — BUMETANIDE 1 MG/1
1 TABLET ORAL 2 TIMES DAILY
COMMUNITY

## 2019-01-19 RX ORDER — POLYETHYLENE GLYCOL 3350 17 G/17G
17 POWDER, FOR SOLUTION ORAL DAILY
Status: DISCONTINUED | OUTPATIENT
Start: 2019-01-20 | End: 2019-01-21

## 2019-01-19 RX ORDER — SIMETHICONE 80 MG
80 TABLET,CHEWABLE ORAL EVERY 6 HOURS PRN
Status: DISCONTINUED | OUTPATIENT
Start: 2019-01-19 | End: 2019-01-21

## 2019-01-19 RX ORDER — FINASTERIDE 5 MG/1
5 TABLET, FILM COATED ORAL DAILY
Status: DISCONTINUED | OUTPATIENT
Start: 2019-01-19 | End: 2019-01-21

## 2019-01-19 RX ORDER — ACETAMINOPHEN 325 MG/1
650 TABLET ORAL EVERY 4 HOURS PRN
COMMUNITY

## 2019-01-19 RX ORDER — ASPIRIN 81 MG
1 TABLET,CHEWABLE ORAL NIGHTLY
Status: ON HOLD | COMMUNITY
End: 2019-07-04 | Stop reason: ALTCHOICE

## 2019-01-19 RX ORDER — SIMETHICONE 80 MG
80 TABLET,CHEWABLE ORAL EVERY 6 HOURS PRN
COMMUNITY
End: 2020-01-01

## 2019-01-19 RX ORDER — MORPHINE SULFATE 2 MG/ML
2 INJECTION, SOLUTION INTRAMUSCULAR; INTRAVENOUS ONCE
Status: DISCONTINUED | OUTPATIENT
Start: 2019-01-19 | End: 2019-01-19

## 2019-01-19 RX ORDER — BUMETANIDE 1 MG/1
1 TABLET ORAL
Status: ON HOLD | COMMUNITY
End: 2019-03-19

## 2019-01-19 RX ORDER — SODIUM CHLORIDE 9 MG/ML
INJECTION, SOLUTION INTRAVENOUS
Status: COMPLETED
Start: 2019-01-19 | End: 2019-01-19

## 2019-01-19 RX ORDER — GABAPENTIN 100 MG/1
100 CAPSULE ORAL 2 TIMES DAILY
Status: DISCONTINUED | OUTPATIENT
Start: 2019-01-19 | End: 2019-01-21

## 2019-01-19 RX ORDER — POLYETHYLENE GLYCOL 3350 17 G/17G
17 POWDER, FOR SOLUTION ORAL 2 TIMES DAILY
COMMUNITY

## 2019-01-19 NOTE — CONSULTS
[facility] AND [facility] Infectious Disease  Report of Consultation    Maikel Rader Patient Status:  Emergency    11/10/1928 MRN [id number]   Location [address] Attending Roxane Mcfarland DO   Hosp Day # 0 PCP TIM Hart SURGERY     • HIP SURGERY Left 2011   • HIP SURGERY Right     S/P IM Nail- Dr Bayron Nelson   • OTHER SURGICAL HISTORY  2-15-12    [facility]/us-Dr. Marv Dennison     Family History   Problem Relation Age of Onset   • Heart Disease Mother       reports that he has quit smok intake/output data recorded. Physical Exam:   General: Awake, alert, non-tox and in NAD. Head: Normocephalic, without obvious abnormality, atraumatic. Eyes: Conjunctivae/corneas clear. No scleral icterus. No conjunctival     hemorrhage.    Nose: Na and correlation with physical examination findings and clinical parameters is recommended.      2. Left hip arthroplasty and postoperative changes of the right hip, resulting in artifactual degradation, and compromising visualization of the pelvic structure

## 2019-01-19 NOTE — PHYSICAL THERAPY NOTE
PHYSICAL THERAPY EVALUATION - INPATIENT     Room Number: 527/527-A  Evaluation Date: 1/19/2019  Type of Evaluation: Initial   Physician Order: PT Eval and Treat    Presenting Problem: cellulitis of sacrum  Reason for Therapy: Mobility Dysfunction and Disc education;Gait training;Strengthening;Balance training  Rehab Potential : Good  Frequency (Obs): 5x/week       PHYSICAL THERAPY MEDICAL/SOCIAL HISTORY     History related to current admission: As above     Problem List  Principal Problem:    Cellulitis of functional limits     Lower extremity ROM is within functional limits     Lower extremity strength is within functional limits     BALANCE  Static Sitting: Fair +  Dynamic Sitting: Fair -  Static Standing: Poor +  Dynamic Standing: Poor -    ADDITIONAL DIA self-stated goal is: return to [facility] #1 Patient is able to demonstrate supine - sit EOB @ level: modified independent     Goal #1   Current Status    Goal #2 Patient is able to demonstrate transfers Sit to/from Stand at assistance level: minimum

## 2019-01-19 NOTE — ED NOTES
Per patient's request, attempted to reach [facility] [phone]. No voicemail was left d/t the voicemail not being set up. Will attempt again at a later time. Pt updated regarding poc.

## 2019-01-19 NOTE — PROGRESS NOTES
[facility] Infectious Disease Progress Note    Lorenza Rosenberg Patient Status:  Inpatient    11/10/1928 MRN [id number]   Location [facility] 5SW/SE Attending Abhishek Martin, DO   Hosp Day # 1 PCP Birgit Lorenzo, MD Gaurav Chaudhry Oral, resp. rate 18, height 6' 2\" (1.88 m), weight 237 lb 9.6 oz (107.8 kg), SpO2 97 %. Temp (24hrs), Av.8 °F (36.6 °C), Min:97.5 °F (36.4 °C), Max:98.2 °F (36.8 °C)      HEENT: Exam is unremarkable. Without scleral icterus.   Mucous membranes are m

## 2019-01-19 NOTE — CM/SW NOTE
01/19/19 1000   CM/SW Screening   Patient's Mental Status Alert;Oriented   Patient's [address] Name [facility]   Patient lives with Alone   Patient Status Prior to Admission   Independent with ADLs and Mobility N

## 2019-01-19 NOTE — PROCEDURES
Procedure:    Dependent scrotal abscess was incised and drained. Packed w Kerlex. Slight purulent material obtained. Plan:  1. antibiotics  2. Dressing changes    Will follow.

## 2019-01-19 NOTE — PROGRESS NOTES
[facility] Hospitalist Progress Note     CC: Hospital Follow up    PCP: Zander Tavera MD       Assessment/Plan:   Patient is a 80year old male with PMH sig for a-fib, GERD, BPH, urinary retention with chronic cleary, prior PE/DVT,prior hemorrhoidal bleed 223 lb (101.2 kg)  06/19/18 0637 : 233 lb 1.6 oz (105.7 kg)  06/18/18 2200 : 236 lb 9.6 oz (107.3 kg)  06/17/18 0450 : 225 lb 6.4 oz (102.2 kg)  06/15/18 0500 : 224 lb 6.4 oz (101.8 kg)  06/14/18 1050 : 217 lb (98.4 kg)      Exam   GEN: NAD  HEENT: EOMI  N fasciitis should not be considered to be excluded on the basis of imaging findings, and correlation with physical examination findings and clinical parameters is recommended.   2. Left hip arthroplasty and postoperative changes of the right hip, resulting i

## 2019-01-19 NOTE — OCCUPATIONAL THERAPY NOTE
OCCUPATIONAL THERAPY EVALUATION - INPATIENT     Room Number: 527/527-A  Evaluation Date: 1/19/2019  Type of Evaluation: Initial  Presenting Problem: (scrotal abscess )    Physician Order: IP Consult to Occupational Therapy  Reason for Therapy: ADL/IADL Dys patient's Approx Degree of Impairment: 66.57% has been calculated based on documentation in the [facility] '6 clicks' Inpatient Daily Activity Short Form. Research supports that patients with this level of impairment may benefit from JOSH.      DISCHARGE RECOMME currently walking     Prior Level of [place]: previously pt was participating in PT and working on standing - at this time pt requires assist for dressing, bathing, meals delivered and primarily in w/c  Sit to stand for transfers     SUBJECTIVE  \" I 30. 6  CMS Modifier (G-Code): CL    FUNCTIONAL TRANSFER ASSESSMENT  Supine to Sit : (max a )  Sit to Stand: (max a x 2)  Toilet Transfer: NT  Shower Transfer: NT  Chair Transfer: NT - use lift     Bedroom Mobility: NT -not appropriate     BALANCE ASSESSMENT

## 2019-01-19 NOTE — H&P
[facility] Hospitalist H&P       CC: Patient presents with:  Bleeding (hematologic)       PCP: Cynthia Thompson MD    ASSESSMENT / PLAN:   Patient is a 80year old male with PMH sig for a-fib, GERD, BPH, urinary retention with chronic cleary, prior PE/DVT,pr • Hypertrophy of prostate without urinary obstruction and other lower urinary tract symptoms (LUTS)    • Muscle weakness (generalized)    • Osteoarthritis    • Other and unspecified hyperlipidemia    • Pulmonary embolism (HCC)    • Thoracic aortic athero murmur, rub or gallop appreciated   Abdomen:   Soft, non-tender. No masses,  No organomegaly. Non distended   Extremities: Extremities normal, atraumatic, no cyanosis or edema.    Skin/: Scrotal erythema noted L>R with tendernes and foul smelling pustular extent is not seen, but gas is seen extending towards the perineum. This may reflect a deep tracking wound, but the possibility of Cassy's gangrene (necrotizing fasciitis involving the scrotal and perineal soft tissues) should be considered.  Please note

## 2019-01-19 NOTE — PROGRESS NOTES
120 [facility] Dosing Service  Antibiotic Dosing    Yao Casillas is a 80year old male for whom pharmacy is dosing zosyn for treatment of  cellulitis. .  Other antibiotics : vancomycin    Allergies: is allergic to sulfa antibiotics.     Vitals: /63

## 2019-01-19 NOTE — CONSULTS
[facility]  Consultation    Maikel Jurist Patient Status:  Inpatient    11/10/1928 MRN [id number]   Location [facility] 5SW/SE Attending Roxane Mcfarland,    Hosp Day # 1 PCP Adrianne Pascal MD     History of Present Illness:  Timoteo Tobar stated age   Head:    Normocephalic, without obvious abnormality, atraumatic   Lungs:     Clear to auscultation bilaterally, respirations unlabored   Abdomen:     Soft, non-tender, bowel sounds active all four quadrants,     no masses, no organomegaly   Ge

## 2019-01-19 NOTE — PLAN OF CARE
Problem: Patient Centered Care  Goal: Patient preferences are identified and integrated in the patient's plan of care  Interventions:  - What would you like us to know as we care for you?   - Provide timely, complete, and accurate information to patient/fa Identify cognitive and physical deficits and behaviors that affect risk of falls.   - [place] fall precautions as indicated by assessment.  - Educate pt/family on patient safety including physical limitations  - Instruct pt to call for assistance with act

## 2019-01-19 NOTE — ED NOTES
Orders for admission, patient is aware of plan and ready to go upstairs.  Any questions, please call ED RN viktor  at extension [phone]

## 2019-01-19 NOTE — ED NOTES
Per patient's request, this RN contacted Wayne's (POA) wife, Aranza Rodriguez at [phone]. Soraya updated regarding poc.

## 2019-01-20 LAB — POTASSIUM SERPL-SCNC: 4.1 MMOL/L (ref 3.3–5.1)

## 2019-01-20 PROCEDURE — 84132 ASSAY OF SERUM POTASSIUM: CPT | Performed by: HOSPITALIST

## 2019-01-20 NOTE — PROGRESS NOTES
[facility]  Progress Note    Maikel Jurist Patient Status:  Inpatient    11/10/1928 MRN [id number]   Location [facility] 5SW/SE Attending Roxane Mcfarland, DO   Hosp Day # 2 PCP Dagoberto Gonzalez MD     Subjective:  Maikel Jurist is tablet 2 tablet Oral Q4H PRN   Piperacillin Sod-Tazobactam So (ZOSYN) 4.5 g in dextrose 5 % 100 mL ADD-vantage 4.5 g Intravenous Q8H   Vancomycin HCl (VANCOCIN) 1,750 mg in sodium chloride 0.9 % 500 mL IVPB 15 mg/kg Intravenous Q24H     Objective:  /

## 2019-01-20 NOTE — PLAN OF CARE
Problem: Patient Centered Care  Goal: Patient preferences are identified and integrated in the patient's plan of care  Interventions:  - What would you like us to know as we care for you?  I am wheelchair bound at [address]  - Provide timely, appropriate  Outcome: Progressing      Problem: SAFETY ADULT - FALL  Goal: Free from fall injury  INTERVENTIONS:  - Assess pt frequently for physical needs  - Identify cognitive and physical deficits and behaviors that affect risk of falls.   - [place] fa

## 2019-01-20 NOTE — PROGRESS NOTES
[facility] Hospitalist Progress Note     CC: Hospital Follow up    PCP: Yamila Dallas MD       Assessment/Plan:   Patient is a 80year old male with PMH sig for a-fib, GERD, BPH, urinary retention with chronic cleary, prior PE/DVT,prior hemorrhoidal bleed Intake 960 ml   Output 2200 ml   Net -1240 ml       Last 3 Weights  01/18/19 2250 : 237 lb 9.6 oz (107.8 kg)  01/18/19 1615 : 223 lb 1.7 oz (101.2 kg)  07/19/18 0922 : 223 lb (101.2 kg)  06/19/18 0637 : 233 lb 1.6 oz (105.7 kg)  06/18/18 2200 : 236 lb 9. very rare occasions imaging findings can provide supplementary/complementary information, necrotizing fasciitis should not be considered to be excluded on the basis of imaging findings, and correlation with physical examination findings and clinical parame

## 2019-01-21 ENCOUNTER — APPOINTMENT (OUTPATIENT)
Dept: PICC SERVICES | Facility: HOSPITAL | Age: 84
DRG: 728 | End: 2019-01-21
Attending: HOSPITALIST
Payer: MEDICARE

## 2019-01-21 ENCOUNTER — APPOINTMENT (OUTPATIENT)
Dept: GENERAL RADIOLOGY | Facility: HOSPITAL | Age: 84
DRG: 728 | End: 2019-01-21
Attending: HOSPITALIST
Payer: MEDICARE

## 2019-01-21 VITALS
TEMPERATURE: 97 F | WEIGHT: 237.63 LBS | RESPIRATION RATE: 18 BRPM | BODY MASS INDEX: 30.5 KG/M2 | HEART RATE: 57 BPM | HEIGHT: 74 IN | SYSTOLIC BLOOD PRESSURE: 155 MMHG | DIASTOLIC BLOOD PRESSURE: 52 MMHG | OXYGEN SATURATION: 96 %

## 2019-01-21 LAB
ANION GAP SERPL CALC-SCNC: 11 MMOL/L (ref 0–18)
BASOPHILS # BLD: 0.1 K/UL (ref 0–0.2)
BASOPHILS NFR BLD: 1 %
BUN SERPL-MCNC: 17 MG/DL (ref 8–20)
BUN/CREAT SERPL: 18.9 (ref 10–20)
CALCIUM SERPL-MCNC: 8.1 MG/DL (ref 8.5–10.5)
CHLORIDE SERPL-SCNC: 102 MMOL/L (ref 95–110)
CO2 SERPL-SCNC: 25 MMOL/L (ref 22–32)
CREAT SERPL-MCNC: 0.9 MG/DL (ref 0.5–1.5)
EOSINOPHIL # BLD: 0.6 K/UL (ref 0–0.7)
EOSINOPHIL NFR BLD: 6 %
ERYTHROCYTE [DISTWIDTH] IN BLOOD BY AUTOMATED COUNT: 16 % (ref 11–15)
GLUCOSE SERPL-MCNC: 106 MG/DL (ref 70–99)
HCT VFR BLD AUTO: 34.4 % (ref 41–52)
HGB BLD-MCNC: 11.4 G/DL (ref 13.5–17.5)
LYMPHOCYTES # BLD: 1.9 K/UL (ref 1–4)
LYMPHOCYTES NFR BLD: 17 %
MCH RBC QN AUTO: 31.7 PG (ref 27–32)
MCHC RBC AUTO-ENTMCNC: 33.2 G/DL (ref 32–37)
MCV RBC AUTO: 95.7 FL (ref 80–100)
MONOCYTES # BLD: 0.9 K/UL (ref 0–1)
MONOCYTES NFR BLD: 8 %
NEUTROPHILS # BLD AUTO: 7.6 K/UL (ref 1.8–7.7)
NEUTROPHILS NFR BLD: 69 %
OSMOLALITY UR CALC.SUM OF ELEC: 288 MOSM/KG (ref 275–295)
PLATELET # BLD AUTO: 348 K/UL (ref 140–400)
PMV BLD AUTO: 7.1 FL (ref 7.4–10.3)
POTASSIUM SERPL-SCNC: 3.7 MMOL/L (ref 3.3–5.1)
RBC # BLD AUTO: 3.6 M/UL (ref 4.5–5.9)
SODIUM SERPL-SCNC: 138 MMOL/L (ref 136–144)
WBC # BLD AUTO: 11 K/UL (ref 4–11)

## 2019-01-21 PROCEDURE — 97110 THERAPEUTIC EXERCISES: CPT

## 2019-01-21 PROCEDURE — 36569 INSJ PICC 5 YR+ W/O IMAGING: CPT

## 2019-01-21 PROCEDURE — 97530 THERAPEUTIC ACTIVITIES: CPT

## 2019-01-21 PROCEDURE — 80048 BASIC METABOLIC PNL TOTAL CA: CPT | Performed by: HOSPITALIST

## 2019-01-21 PROCEDURE — 85025 COMPLETE CBC W/AUTO DIFF WBC: CPT | Performed by: HOSPITALIST

## 2019-01-21 PROCEDURE — 76937 US GUIDE VASCULAR ACCESS: CPT

## 2019-01-21 PROCEDURE — 02HV33Z INSERTION OF INFUSION DEVICE INTO SUPERIOR VENA CAVA, PERCUTANEOUS APPROACH: ICD-10-PCS | Performed by: HOSPITALIST

## 2019-01-21 PROCEDURE — 71045 X-RAY EXAM CHEST 1 VIEW: CPT | Performed by: HOSPITALIST

## 2019-01-21 PROCEDURE — 99211 OFF/OP EST MAY X REQ PHY/QHP: CPT

## 2019-01-21 RX ORDER — ARIPIPRAZOLE 15 MG/1
40 TABLET ORAL ONCE
Status: COMPLETED | OUTPATIENT
Start: 2019-01-21 | End: 2019-01-21

## 2019-01-21 RX ORDER — SODIUM CHLORIDE 0.9 % (FLUSH) 0.9 %
10 SYRINGE (ML) INJECTION AS NEEDED
Status: DISCONTINUED | OUTPATIENT
Start: 2019-01-21 | End: 2019-01-21

## 2019-01-21 RX ORDER — LIDOCAINE HYDROCHLORIDE 10 MG/ML
0.5 INJECTION, SOLUTION INFILTRATION; PERINEURAL ONCE AS NEEDED
Status: DISCONTINUED | OUTPATIENT
Start: 2019-01-21 | End: 2019-01-21

## 2019-01-21 NOTE — PROGRESS NOTES
Naveen Zayas is a 80year old male. Patient presents with:  Bleeding (hematologic)      HPI:    S/p drainage scrotal abscess    REVIEW OF SYSTEMS:   A comprehensive 11 point review of systems was completed.   Pertinent positives and negatives noted i cyanosis. NEURO:  No focal neurologic deficits. DERM:  Warm, dry, no rashes.   IV Site: ok      IMPRESSION/PLAN:     Assessment/Plan:     1.  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

## 2019-01-21 NOTE — PROGRESS NOTES
[facility]    Progress Note    Raad Rodriguez Patient Status:  Inpatient    11/10/1928 MRN [id number]   Location [facility] 5SW/SE Attending [place] MD   Hosp Day # 3 PCP Josesito Velásquez MD     Subjective:  Nikunj Hendricks [phone]

## 2019-01-21 NOTE — PROGRESS NOTES
[facility] Hospitalist Progress Note     CC: Hospital Follow up    PCP: Lisa Roblero MD       Assessment/Plan:   Patient is a 80year old male with PMH sig for a-fib, GERD, BPH, urinary retention with chronic cleary, prior PE/DVT,prior hemorrhoidal bleed data filed at 1/21/2019 0549  Gross per 24 hour   Intake 220 ml   Output 1900 ml   Net -1680 ml       Last 3 Weights  01/18/19 2250 : 237 lb 9.6 oz (107.8 kg)  01/18/19 1615 : 223 lb 1.7 oz (101.2 kg)  07/19/18 0922 : 223 lb (101.2 kg)  06/19/18 2713 : 233 gangrene (necrotizing fasciitis involving the scrotal and perineal soft tissues) should be considered. Please note, necrotizing fasciitis is virtually an entirely clinical diagnosis.  While on very rare occasions imaging findings can provide supplementary/c

## 2019-01-21 NOTE — SIGNIFICANT EVENT
RN notified PICC line in brachial cephalic vein. Discharge held till PICC line usage cleared with MD. Per PICC line RN, Dr Zulema Nathan agreed that PICC could still be used. Transport rescheduled with [facility] for [id number].

## 2019-01-21 NOTE — OCCUPATIONAL THERAPY NOTE
OCCUPATIONAL THERAPY TREATMENT NOTE - INPATIENT        Room Number: 527/527-A           Presenting Problem: (scrotal abscess )    Problem List  Principal Problem:    Cellulitis of scrotum      OCCUPATIONAL THERAPY ASSESSMENT     Chart review complete, RN c the toilet\"     OBJECTIVE  Precautions: Bed/chair alarm;Hard of hearing    WEIGHT BEARING RESTRICTION  Weight Bearing Restriction: None                PAIN ASSESSMENT  Ratin           ACTIVITY TOLERANCE  Pt tolerates BS chair exercise and attempt STS

## 2019-01-21 NOTE — PHYSICAL THERAPY NOTE
PHYSICAL THERAPY TREATMENT NOTE - INPATIENT     Room Number: 527/527-A       Presenting Problem: cellulitis of sacrum    Problem List  Principal Problem:    Cellulitis of scrotum      PHYSICAL THERAPY ASSESSMENT   Pt has not walk at least since June 2018 a education;Gait training;Strengthening;Balance training    SUBJECTIVE  I have not walk in a while    OBJECTIVE  Precautions: Bed/chair alarm;Hard of hearing    WEIGHT BEARING RESTRICTION  Weight Bearing Restriction: None        PAIN ASSESSMENT   Ratin Patient is able to demonstrate transfers Sit to/from Stand at assistance level: minimum assistance with walker - rolling      Goal #2  Current Status  Unable 1/21/2019     Goal #3 Patient is able to ambulate 10 feet with assist device: walker - rolling at

## 2019-01-21 NOTE — PLAN OF CARE
Problem: Patient Centered Care  Goal: Patient preferences are identified and integrated in the patient's plan of care  Interventions:  - What would you like us to know as we care for you?  I am wheelchair bound at [address]  - Provide timely, interventions unsuccessful or patient reports new pain  - Anticipate increased pain with activity and pre-medicate as appropriate  Outcome: Progressing      Problem: SAFETY ADULT - FALL  Goal: Free from fall injury  INTERVENTIONS:  - Assess pt frequently f isolation precautions as appropriate  - Initiate Pressure Ulcer prevention bundle as indicated  Outcome: Progressing      Problem: RISK FOR INFECTION - ADULT  Goal: Absence of fever/infection during anticipated neutropenic period  INTERVENTIONS  - Monitor

## 2019-01-21 NOTE — PROGRESS NOTES
[facility] AND [facility] Infectious Disease  Progress Note    Sherren Rasher Patient Status:  Inpatient    11/10/1928 MRN [id number]   Location [facility] 5SW/SE Attending Jorge Correa MD   Hosp Day # 3 PCP Vonna Apgar, MD Neida Blalock virtually an entirely clinical diagnosis.  While on very rare occasions imaging findings can provide supplementary/complementary information, necrotizing fasciitis should not be considered to be excluded on the basis of imaging findings, and correlation wit

## 2019-01-21 NOTE — CM/SW NOTE
Pt able to d/c today    [facility] liaison agreeable w/ 5p d/c time  RN aware of d/c time  Pt aware of d/c time  SW contacted [facility] for ambulance (max assist)    [facility] # [phone]    Georg Habermann, [address]

## 2019-01-21 NOTE — PLAN OF CARE
Problem: Patient Centered Care  Goal: Patient preferences are identified and integrated in the patient's plan of care  Interventions:  - What would you like us to know as we care for you?  I am wheelchair bound at [address]  - Provide timely, from fall injury  INTERVENTIONS:  - Assess pt frequently for physical needs  - Identify cognitive and physical deficits and behaviors that affect risk of falls.   - [place] fall precautions as indicated by assessment.  - Educate pt/family on patient safet period  INTERVENTIONS  - Monitor WBC  - Administer growth factors as ordered  - Implement neutropenic guidelines  Outcome: Adequate for Discharge

## 2019-01-22 NOTE — PROGRESS NOTES
Call to Dr Javier Wing, regarding radiology reading of left PICC tip. Patient had extremely long anatomy and the PICC tip  is in the very Distal Brachiocephalic per x-ray. 2 weeks of antibiotics ordered.  Dr. Thai Menchaca with placement and patient to discharge this e

## 2019-01-22 NOTE — DISCHARGE SUMMARY
General Medicine Discharge Summary     Patient ID:  Peggy Mahajan  80year old  11/10/1928    Admit date: 1/18/2019    Discharge date and time: 01/21/19    Attending Physician: Dale Regalado MD     Primary Care Physician: Lakeisha Garcia MD Prior dvt/pe  -resumed eliquis     Chronic cleary/urinary retention  -monitor     Debility s/p R hip fracture/surgery  -long term resident [facility]  -pt/ot JOS[facility] prater rec'd, planning [facility] on d/c         Exam   GEN: NAD  HEENT: EOMI  Neck: no JVD  Pulm: C Cholecalciferol 5000 units Tabs  Commonly known as:  VITAMIN D-3     colchicine 0.6 MG Tabs     docusate sodium 100 MG Caps  Commonly known as:  COLACE     finasteride 5 MG Tabs  Commonly known as:  PROSCAR  Take 1 tablet (5 mg total) by mouth once daily. Wound Care: keep wound clean and dry  Code Status: Full Code  O2: n/a    Follow-up with:    PCP   Specialist urology, ID      FU      DC instructions:         Follow-up with labs: none    Total Time Coordinating Care: Greater than 30 minutes    Patient had

## 2019-03-18 ENCOUNTER — APPOINTMENT (OUTPATIENT)
Dept: GENERAL RADIOLOGY | Facility: HOSPITAL | Age: 84
DRG: 698 | End: 2019-03-18
Attending: EMERGENCY MEDICINE
Payer: MEDICARE

## 2019-03-18 ENCOUNTER — HOSPITAL ENCOUNTER (INPATIENT)
Facility: HOSPITAL | Age: 84
LOS: 4 days | Discharge: NURSING, REHAB OR LONG-TERM CARE | DRG: 698 | End: 2019-03-22
Attending: EMERGENCY MEDICINE | Admitting: INTERNAL MEDICINE
Payer: MEDICARE

## 2019-03-18 ENCOUNTER — APPOINTMENT (OUTPATIENT)
Dept: CT IMAGING | Facility: HOSPITAL | Age: 84
DRG: 698 | End: 2019-03-18
Attending: EMERGENCY MEDICINE
Payer: MEDICARE

## 2019-03-18 DIAGNOSIS — J18.9 NOSOCOMIAL PNEUMONIA: ICD-10-CM

## 2019-03-18 DIAGNOSIS — N39.0 URINARY TRACT INFECTION ASSOCIATED WITH INDWELLING URETHRAL CATHETER, INITIAL ENCOUNTER (HCC): Primary | ICD-10-CM

## 2019-03-18 DIAGNOSIS — A41.9 SEPTIC SHOCK (HCC): ICD-10-CM
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
## 2019-03-18 DIAGNOSIS — T83.511A URINARY TRACT INFECTION ASSOCIATED WITH INDWELLING URETHRAL CATHETER, INITIAL ENCOUNTER (HCC): Primary | ICD-10-CM

## 2019-03-18 DIAGNOSIS — R65.21 SEPTIC SHOCK (HCC): ICD-10-CM

## 2019-03-18 LAB
ALBUMIN SERPL-MCNC: 2.2 G/DL (ref 3.4–5)
ALP LIVER SERPL-CCNC: 229 U/L (ref 45–117)
ALT SERPL-CCNC: 19 U/L (ref 16–61)
ANION GAP SERPL CALC-SCNC: 15 MMOL/L (ref 0–18)
AST SERPL-CCNC: 30 U/L (ref 15–37)
BASOPHILS # BLD AUTO: 0.04 X10(3) UL (ref 0–0.2)
BASOPHILS NFR BLD AUTO: 0.2 %
BILIRUB DIRECT SERPL-MCNC: 0.2 MG/DL (ref 0–0.2)
BILIRUB SERPL-MCNC: 0.7 MG/DL (ref 0.1–2)
BUN BLD-MCNC: 69 MG/DL (ref 7–18)
BUN/CREAT SERPL: 30.3 (ref 10–20)
CALCIUM BLD-MCNC: 8.6 MG/DL (ref 8.5–10.1)
CHLORIDE SERPL-SCNC: 101 MMOL/L (ref 98–107)
CO2 SERPL-SCNC: 22 MMOL/L (ref 21–32)
CREAT BLD-MCNC: 2.28 MG/DL (ref 0.7–1.3)
DEPRECATED RDW RBC AUTO: 59.2 FL (ref 35.1–46.3)
EOSINOPHIL # BLD AUTO: 0 X10(3) UL (ref 0–0.7)
EOSINOPHIL NFR BLD AUTO: 0 %
ERYTHROCYTE [DISTWIDTH] IN BLOOD BY AUTOMATED COUNT: 16.3 % (ref 11–15)
GLUCOSE BLD-MCNC: 163 MG/DL (ref 70–99)
HCT VFR BLD AUTO: 41.5 % (ref 39–53)
HGB BLD-MCNC: 13.1 G/DL (ref 13–17.5)
IMM GRANULOCYTES # BLD AUTO: 0.38 X10(3) UL (ref 0–1)
IMM GRANULOCYTES NFR BLD: 1.6 %
LACTATE SERPL-SCNC: 4.7 MMOL/L (ref 0.4–2)
LACTATE SERPL-SCNC: 5.9 MMOL/L (ref 0.4–2)
LACTATE SERPL-SCNC: 6 MMOL/L (ref 0.4–2)
LACTATE SERPL-SCNC: 6.6 MMOL/L (ref 0.4–2)
LYMPHOCYTES # BLD AUTO: 0.56 X10(3) UL (ref 1–4)
LYMPHOCYTES NFR BLD AUTO: 2.4 %
M PROTEIN MFR SERPL ELPH: 6.4 G/DL (ref 6.4–8.2)
MCH RBC QN AUTO: 31.5 PG (ref 26–34)
MCHC RBC AUTO-ENTMCNC: 31.6 G/DL (ref 31–37)
MCV RBC AUTO: 99.8 FL (ref 80–100)
MONOCYTES # BLD AUTO: 1.54 X10(3) UL (ref 0.1–1)
MONOCYTES NFR BLD AUTO: 6.6 %
MRSA DNA SPEC QL NAA+PROBE: POSITIVE
NEUTROPHILS # BLD AUTO: 20.99 X10 (3) UL (ref 1.5–7.7)
NEUTROPHILS # BLD AUTO: 20.99 X10(3) UL (ref 1.5–7.7)
NEUTROPHILS NFR BLD AUTO: 89.2 %
OSMOLALITY SERPL CALC.SUM OF ELEC: 310 MOSM/KG (ref 275–295)
PLATELET # BLD AUTO: 274 10(3)UL (ref 150–450)
POTASSIUM SERPL-SCNC: 3.5 MMOL/L (ref 3.5–5.1)
RBC # BLD AUTO: 4.16 X10(6)UL (ref 3.8–5.8)
SODIUM SERPL-SCNC: 138 MMOL/L (ref 136–145)
WBC # BLD AUTO: 23.5 X10(3) UL (ref 4–11)

## 2019-03-18 PROCEDURE — 71045 X-RAY EXAM CHEST 1 VIEW: CPT | Performed by: EMERGENCY MEDICINE

## 2019-03-18 PROCEDURE — 80048 BASIC METABOLIC PNL TOTAL CA: CPT | Performed by: EMERGENCY MEDICINE

## 2019-03-18 PROCEDURE — 87641 MR-STAPH DNA AMP PROBE: CPT | Performed by: EMERGENCY MEDICINE

## 2019-03-18 PROCEDURE — 96365 THER/PROPH/DIAG IV INF INIT: CPT | Performed by: EMERGENCY MEDICINE

## 2019-03-18 PROCEDURE — 51702 INSERT TEMP BLADDER CATH: CPT | Performed by: EMERGENCY MEDICINE

## 2019-03-18 PROCEDURE — 99285 EMERGENCY DEPT VISIT HI MDM: CPT | Performed by: EMERGENCY MEDICINE

## 2019-03-18 PROCEDURE — 85025 COMPLETE CBC W/AUTO DIFF WBC: CPT | Performed by: EMERGENCY MEDICINE

## 2019-03-18 PROCEDURE — 80076 HEPATIC FUNCTION PANEL: CPT | Performed by: HOSPITALIST

## 2019-03-18 PROCEDURE — 74176 CT ABD & PELVIS W/O CONTRAST: CPT | Performed by: EMERGENCY MEDICINE

## 2019-03-18 PROCEDURE — 93010 ELECTROCARDIOGRAM REPORT: CPT | Performed by: EMERGENCY MEDICINE

## 2019-03-18 PROCEDURE — 83605 ASSAY OF LACTIC ACID: CPT | Performed by: EMERGENCY MEDICINE

## 2019-03-18 PROCEDURE — 93005 ELECTROCARDIOGRAM TRACING: CPT

## 2019-03-18 PROCEDURE — 83605 ASSAY OF LACTIC ACID: CPT | Performed by: HOSPITALIST

## 2019-03-18 PROCEDURE — 96367 TX/PROPH/DG ADDL SEQ IV INF: CPT | Performed by: EMERGENCY MEDICINE

## 2019-03-18 PROCEDURE — 87040 BLOOD CULTURE FOR BACTERIA: CPT | Performed by: EMERGENCY MEDICINE

## 2019-03-18 PROCEDURE — 3E03329 INTRODUCTION OF OTHER ANTI-INFECTIVE INTO PERIPHERAL VEIN, PERCUTANEOUS APPROACH: ICD-10-PCS | Performed by: INTERNAL MEDICINE

## 2019-03-18 RX ORDER — SODIUM CHLORIDE 9 MG/ML
INJECTION, SOLUTION INTRAVENOUS ONCE
Status: COMPLETED | OUTPATIENT
Start: 2019-03-18 | End: 2019-03-18

## 2019-03-18 RX ORDER — FINASTERIDE 5 MG/1
5 TABLET, FILM COATED ORAL
Status: DISCONTINUED | OUTPATIENT
Start: 2019-03-19 | End: 2019-03-22

## 2019-03-18 RX ORDER — PRAVASTATIN SODIUM 20 MG
20 TABLET ORAL DAILY
Status: DISCONTINUED | OUTPATIENT
Start: 2019-03-20 | End: 2019-03-22

## 2019-03-18 RX ORDER — SODIUM CHLORIDE 0.9 % (FLUSH) 0.9 %
3 SYRINGE (ML) INJECTION AS NEEDED
Status: DISCONTINUED | OUTPATIENT
Start: 2019-03-18 | End: 2019-03-22

## 2019-03-18 RX ORDER — ACETAMINOPHEN 325 MG/1
TABLET ORAL
Status: COMPLETED
Start: 2019-03-18 | End: 2019-03-18

## 2019-03-18 RX ORDER — ACETAMINOPHEN 325 MG/1
650 TABLET ORAL EVERY 6 HOURS PRN
Status: DISCONTINUED | OUTPATIENT
Start: 2019-03-18 | End: 2019-03-18

## 2019-03-18 RX ORDER — ALFUZOSIN HYDROCHLORIDE 10 MG/1
10 TABLET, EXTENDED RELEASE ORAL
Status: DISCONTINUED | OUTPATIENT
Start: 2019-03-19 | End: 2019-03-22

## 2019-03-18 RX ORDER — ALLOPURINOL 100 MG/1
100 TABLET ORAL DAILY
Status: DISCONTINUED | OUTPATIENT
Start: 2019-03-18 | End: 2019-03-22

## 2019-03-18 RX ORDER — SODIUM CHLORIDE 9 MG/ML
INJECTION, SOLUTION INTRAVENOUS CONTINUOUS
Status: DISCONTINUED | OUTPATIENT
Start: 2019-03-18 | End: 2019-03-19

## 2019-03-18 RX ORDER — PRAVASTATIN SODIUM 20 MG
20 TABLET ORAL DAILY
Status: DISCONTINUED | OUTPATIENT
Start: 2019-03-19 | End: 2019-03-18

## 2019-03-18 RX ORDER — ONDANSETRON 2 MG/ML
4 INJECTION INTRAMUSCULAR; INTRAVENOUS EVERY 6 HOURS PRN
Status: DISCONTINUED | OUTPATIENT
Start: 2019-03-18 | End: 2019-03-22

## 2019-03-18 RX ORDER — ACETAMINOPHEN 325 MG/1
650 TABLET ORAL EVERY 6 HOURS PRN
Status: DISCONTINUED | OUTPATIENT
Start: 2019-03-18 | End: 2019-03-22

## 2019-03-18 NOTE — ED NOTES
Pt has received a total of 1700ml+ at this time. IVFs remain infusing per septic protocol. 2nd line previously started. No SOB per pt.

## 2019-03-18 NOTE — PROGRESS NOTES
[facility] Pharmacy Note: Antimicrobial Weight Dose Adjustment for: meropenem (Garcia Mederos)    Jagjit Naidu is a 80year old male who has been prescribed meropenem (MERREM) 1 gm x1.   CrCl is estimated creatinine clearance is 29.6 mL/min (A) (based on SCr of 1.93

## 2019-03-18 NOTE — ED NOTES
Pt presents from [facility] for sepsis rule out, low blood pressure, and UTI concerns. Pt remains to cart alert and oriented X2. Place and person. Dr. Monsivais All to room. Line in place already. 700ml of NS infused at this time.

## 2019-03-18 NOTE — ED NOTES
Dr. Rodrigo Greco at bedside. Total of 800ml additional needs to be infused before 3150ml demand met. Pt remains alert and to room speaking with Dr. Rodrigo Greco at this time.

## 2019-03-18 NOTE — ED INITIAL ASSESSMENT (HPI)
C/o low BP, increased confusion, sent from [facility] for possible sepsis. Pt has indwelling catheter, on cipro.  Pt denies pain at this time

## 2019-03-18 NOTE — ED PROVIDER NOTES
Patient Seen in: [facility] AND [facility] Emergency Department    History   Patient presents with:  Hypotension (cardiovascular)  Fever (infectious)    Stated Complaint: fever, cloudy urine.  sent from [facility]    HPI    69-year-old male with history of A. fib above.    Physical Exam     ED Triage Vitals   BP 03/18/19 1606 (!) 76/58   Pulse 03/18/19 1606 74   Resp 03/18/19 1606 18   Temp 03/18/19 1625 100.3 °F (37.9 °C)   Temp src 03/18/19 1625 Rectal   SpO2 03/18/19 1606 95 %   O2 Device 03/18/19 1827 None [facility] components within normal limits   LACTIC ACID, PLASMA - Abnormal; Notable for the following components:    Lactic Acid 6.6 (*)     All other components within normal limits   URINALYSIS WITH CULTURE REFLEX - Abnormal; Notable for the following components: Morphology See morphology below (*)     Vacuolated Neutrophils Present (*)     All other components within normal limits   LACTIC ACID 3 HR POST POSITIVE - Abnormal; Notable for the following components:    Lactic Acid 3.0 (*)     All other components with Abnormal            Final result                 Please view results for these tests on the individual orders.    LACTIC ACID RFLX DO NOT CANCEL   SCAN SLIDE   LACTIC ACID REFLEX DO NOT CANCEL X2   LACTIC ACID RFLX DO NOT CANCEL   CBC WITH DIFFERENTIAL WITH reconstruction technique for dose reduction was employed. FINDINGS: COMMENT: Overall examination quality is substantially compromised by patient motion artifact.  Evaluation of the vasculature and of the abdominal viscera for the presence of intraparenchym There is no colonic wall thickening or pericolonic fat stranding. The rectal vault is markedly distended with fecal material, measuring approximately 8.5 cm transversely. Mild rectal wall thickening is observed.   URINARY BLADDER: Artifactual degradation re lobe may reflect atelectasis, aspiration pneumonitis, or other pneumonia in the appropriate clinical context. 5. Borderline hepatomegaly. 6. Rectal vault distension with associated rectal wall thickening.  This may reflect fecal impaction, with or without pulmonary.         I spent a total of 60 minutes of critical care time in obtaining history, performing a physical exam, bedside monitoring of interventions, collecting and interpreting tests and discussion with consultants but not including time spent perf

## 2019-03-19 LAB
ALBUMIN SERPL-MCNC: 1.9 G/DL (ref 3.4–5)
ALBUMIN/GLOB SERPL: 0.5 {RATIO} (ref 1–2)
ALP LIVER SERPL-CCNC: 204 U/L (ref 45–117)
ALT SERPL-CCNC: 18 U/L (ref 16–61)
ANION GAP SERPL CALC-SCNC: 9 MMOL/L (ref 0–18)
AST SERPL-CCNC: 23 U/L (ref 15–37)
BACTERIA UR QL AUTO: NEGATIVE /HPF
BASOPHILS # BLD: 0 X10(3) UL (ref 0–0.2)
BASOPHILS NFR BLD: 0 %
BILIRUB SERPL-MCNC: 0.6 MG/DL (ref 0.1–2)
BILIRUB UR QL: NEGATIVE
BUN BLD-MCNC: 61 MG/DL (ref 7–18)
BUN/CREAT SERPL: 37 (ref 10–20)
CALCIUM BLD-MCNC: 7.8 MG/DL (ref 8.5–10.1)
CHLORIDE SERPL-SCNC: 106 MMOL/L (ref 98–107)
CO2 SERPL-SCNC: 26 MMOL/L (ref 21–32)
COLOR UR: YELLOW
CREAT BLD-MCNC: 1.65 MG/DL (ref 0.7–1.3)
DEPRECATED RDW RBC AUTO: 59.7 FL (ref 35.1–46.3)
EOSINOPHIL # BLD: 0 X10(3) UL (ref 0–0.7)
EOSINOPHIL NFR BLD: 0 %
ERYTHROCYTE [DISTWIDTH] IN BLOOD BY AUTOMATED COUNT: 16.3 % (ref 11–15)
GLOBULIN PLAS-MCNC: 4.1 G/DL (ref 2.8–4.4)
GLUCOSE BLD-MCNC: 157 MG/DL (ref 70–99)
GLUCOSE UR-MCNC: NEGATIVE MG/DL
HAV IGM SER QL: 1.7 MG/DL (ref 1.6–2.6)
HCT VFR BLD AUTO: 35.5 % (ref 39–53)
HGB BLD-MCNC: 11.2 G/DL (ref 13–17.5)
HYALINE CASTS #/AREA URNS AUTO: 12 /LPF
KETONES UR-MCNC: NEGATIVE MG/DL
LACTATE SERPL-SCNC: 2.4 MMOL/L (ref 0.4–2)
LACTATE SERPL-SCNC: 2.7 MMOL/L (ref 0.4–2)
LACTATE SERPL-SCNC: 3 MMOL/L (ref 0.4–2)
LACTATE SERPL-SCNC: 3.1 MMOL/L (ref 0.4–2)
LACTATE SERPL-SCNC: 3.7 MMOL/L (ref 0.4–2)
LYMPHOCYTES NFR BLD: 0.69 X10(3) UL (ref 1–4)
LYMPHOCYTES NFR BLD: 2 %
M PROTEIN MFR SERPL ELPH: 6 G/DL (ref 6.4–8.2)
MCH RBC QN AUTO: 31.4 PG (ref 26–34)
MCHC RBC AUTO-ENTMCNC: 31.5 G/DL (ref 31–37)
MCV RBC AUTO: 99.4 FL (ref 80–100)
MONOCYTES # BLD: 1.73 X10(3) UL (ref 0.1–1)
MONOCYTES NFR BLD: 5 %
NEUTROPHILS # BLD AUTO: 30.43 X10 (3) UL (ref 1.5–7.7)
NEUTROPHILS NFR BLD: 58 %
NEUTS BAND NFR BLD: 35 %
NEUTS HYPERSEG # BLD: 32.09 X10(3) UL (ref 1.5–7.7)
NEUTS VAC BLD QL SMEAR: PRESENT
NITRITE UR QL STRIP.AUTO: NEGATIVE
OSMOLALITY SERPL CALC.SUM OF ELEC: 313 MOSM/KG (ref 275–295)
PH UR: 5 [PH] (ref 5–8)
PLATELET # BLD AUTO: 292 10(3)UL (ref 150–450)
PLATELET MORPHOLOGY: NORMAL
POTASSIUM SERPL-SCNC: 3.7 MMOL/L (ref 3.5–5.1)
PROT UR-MCNC: 30 MG/DL
RBC # BLD AUTO: 3.57 X10(6)UL (ref 3.8–5.8)
RBC #/AREA URNS AUTO: 43 /HPF
SODIUM SERPL-SCNC: 141 MMOL/L (ref 136–145)
SP GR UR STRIP: 1.02 (ref 1–1.03)
TOTAL CELLS COUNTED: 100
UROBILINOGEN UR STRIP-ACNC: <2
VIT C UR-MCNC: 20 MG/DL
WBC # BLD AUTO: 34.5 X10(3) UL (ref 4–11)
WBC #/AREA URNS AUTO: 2291 /HPF

## 2019-03-19 PROCEDURE — 83605 ASSAY OF LACTIC ACID: CPT | Performed by: INTERNAL MEDICINE

## 2019-03-19 PROCEDURE — 99211 OFF/OP EST MAY X REQ PHY/QHP: CPT

## 2019-03-19 PROCEDURE — 83605 ASSAY OF LACTIC ACID: CPT | Performed by: HOSPITALIST

## 2019-03-19 PROCEDURE — 83735 ASSAY OF MAGNESIUM: CPT | Performed by: HOSPITALIST

## 2019-03-19 PROCEDURE — 81001 URINALYSIS AUTO W/SCOPE: CPT | Performed by: HOSPITALIST

## 2019-03-19 PROCEDURE — 80053 COMPREHEN METABOLIC PANEL: CPT | Performed by: HOSPITALIST

## 2019-03-19 PROCEDURE — 85007 BL SMEAR W/DIFF WBC COUNT: CPT | Performed by: HOSPITALIST

## 2019-03-19 PROCEDURE — 85027 COMPLETE CBC AUTOMATED: CPT | Performed by: HOSPITALIST

## 2019-03-19 PROCEDURE — 87086 URINE CULTURE/COLONY COUNT: CPT | Performed by: HOSPITALIST

## 2019-03-19 PROCEDURE — 85025 COMPLETE CBC W/AUTO DIFF WBC: CPT | Performed by: HOSPITALIST

## 2019-03-19 RX ORDER — LOPERAMIDE HYDROCHLORIDE 2 MG/1
2 CAPSULE ORAL AS NEEDED
COMMUNITY
End: 2020-01-01

## 2019-03-19 RX ORDER — POLYETHYLENE GLYCOL 3350 17 G/17G
17 POWDER, FOR SOLUTION ORAL DAILY
Status: DISCONTINUED | OUTPATIENT
Start: 2019-03-19 | End: 2019-03-22

## 2019-03-19 RX ORDER — MAGNESIUM OXIDE 400 MG (241.3 MG MAGNESIUM) TABLET
400 TABLET ONCE
Status: COMPLETED | OUTPATIENT
Start: 2019-03-19 | End: 2019-03-19

## 2019-03-19 RX ORDER — POTASSIUM CHLORIDE 14.9 MG/ML
20 INJECTION INTRAVENOUS ONCE
Status: COMPLETED | OUTPATIENT
Start: 2019-03-19 | End: 2019-03-19

## 2019-03-19 RX ORDER — SODIUM CHLORIDE 9 MG/ML
INJECTION, SOLUTION INTRAVENOUS
Status: COMPLETED
Start: 2019-03-19 | End: 2019-03-19

## 2019-03-19 RX ORDER — SENNA AND DOCUSATE SODIUM 50; 8.6 MG/1; MG/1
2 TABLET, FILM COATED ORAL DAILY
Status: DISCONTINUED | OUTPATIENT
Start: 2019-03-19 | End: 2019-03-22

## 2019-03-19 RX ORDER — BISACODYL 10 MG
10 SUPPOSITORY, RECTAL RECTAL ONCE
Status: COMPLETED | OUTPATIENT
Start: 2019-03-19 | End: 2019-03-19

## 2019-03-19 NOTE — ED NOTES
Pt with redness and irritation noted around his anterior, shaft and corona of penis. NO scrotal irritation or infection noted on exam. Care ongoing.

## 2019-03-19 NOTE — PROGRESS NOTES
[facility] Hospitalist Progress Note     Reason for Admission: urosepsis  PCP: Marlon Shepherd MD     Assessment/Plan:     Principal Problem:    Urinary tract infection associated with indwelling urethral catheter, initial encounter [facility])  Active Problems: draining appropriately      Debility s/p R hip fracture/surgery  -long term resident [facility]  -pt/ot eval     Gout:  -holding home colchicine with MICHA, continue allopurinol     GOC:  - patient DNR on admission  - did not want central line, can defer 3.57*   HGB  13.9  13.1  11.2*   HCT  43.5  41.5  35.5*   MCV  98.4  99.8  99.4   MCH  31.4  31.5  31.4   MCHC  32.0  31.6  31.5   RDW  16.1*  16.3*  16.3*   NEPRELIM  11.64*  20.99*  30.43*   WBC  12.2*  23.5*  34.5*   PLT  288.0  274.0  292.0         Re at 18:52          Xr Chest Ap Portable  (cpt=71045)    Result Date: 3/18/2019  CONCLUSION:  1. Mild right lower lung opacification.     Dictated by (CST): Carolina Christianson MD on 3/18/2019 at 17:39     Approved by (CST): Arcadio Hidalgo MD on

## 2019-03-19 NOTE — CM/SW NOTE
Received MDO to evaluate social support. Patient is a short term resident at [facility] ([phone]). Patient can return when stable, confirmed with [facility] & patient.     Jason Watters, [address]

## 2019-03-19 NOTE — PROGRESS NOTES
120 [facility] dosing service    Initial Pharmacokinetic Consult for Vancomycin Dosing     Lydia Castillo is a 80year old male admitted on 3/18 who is being treated for sepsis.   Pharmacy has been asked to dose Vancomycin by Dr. Betzy Becerra    He is allergic to Pharmacy will need BUN/Scr daily while on Vancomycin to assess renal function. 4.  Pharmacy will follow and monitor renal function changes, toxicity and efficacy. Pharmacy will continue to follow him.   We appreciate the opportunity to assist in his c

## 2019-03-19 NOTE — PROGRESS NOTES
[facility]      Sepsis Reassessment Note    BP (!) 78/58   Pulse 68   Temp (!) 100.5 °F (38.1 °C) (Rectal)   Resp 18   Wt 231 lb 7.7 oz (105 kg)   SpO2 99%   BMI 29.72 kg/m²      7:36 PM    Cardiac:  Regularity: Irregular  Rate: Tachycard

## 2019-03-19 NOTE — PROGRESS NOTES
Night MD - CCU Admission    79 y/o man brought to ED from [facility] with hx of fever, cloudy urine and hypotension. Subsequently transferred to CCU. Please see Dr. Ling Gao H&P for details.     Impressions:  ---Septic shock, with source presumed UTI, with c 77/63 — — 60 26 98 % —   03/18/19 2033 (!) 79/54 — — 67 17 99 % —   03/18/19 2000 (!) 72/46 98 °F (36.7 °C) Temporal 69 23 97 % —   03/18/19 1900 (!) 78/58 — — 68 18 99 % —   03/18/19 1845 (!) 79/58 — — 73 24 92 % —   03/18/19 1830 (!) 78/61 — — 72 21 97 %

## 2019-03-19 NOTE — H&P
[facility] Hospitalist H&P       CC: Patient presents with:  Hypotension (cardiovascular)  Fever (infectious)       PCP: Dagoberto Gonzalez MD    History of Present Illness: Patient is a 80 year old male with PMH sig for a-fib, GERD, BPH, urinary retention marked as taking for the 3/18/19 encounter [facility] Encounter).       Soc Hx  Social History    Tobacco Use      Smoking status: Former Smoker      Smokeless tobacco: Never Used      Tobacco comment: very social smoker in 80s    Alcohol use: No      Alco Abdomen+pelvis(cpt=74176)    Result Date: 3/18/2019  CONCLUSION:  1. There is a Horton balloon in the penile urethra. Balloon deflation and removal is advised. 2. The bladder is markedly distended with numerous dependent bladder calculi.  A punctate focus o Shock 2/2 UTI due to chronic indwelling cleary / lactic acidosis / Leucocytosis   - on admit WBC 23.5, LA 6.6, UA pending but urine cloudy, CT abd with distended bladder  - blood cultures urine cultures, CXR poss infiltrates  - cleary exchanged in ED, adjust

## 2019-03-20 LAB
ANION GAP SERPL CALC-SCNC: 7 MMOL/L (ref 0–18)
BUN BLD-MCNC: 54 MG/DL (ref 7–18)
BUN/CREAT SERPL: 56.8 (ref 10–20)
CALCIUM BLD-MCNC: 8.2 MG/DL (ref 8.5–10.1)
CHLORIDE SERPL-SCNC: 108 MMOL/L (ref 98–107)
CO2 SERPL-SCNC: 27 MMOL/L (ref 21–32)
CREAT BLD-MCNC: 0.95 MG/DL (ref 0.7–1.3)
DEPRECATED RDW RBC AUTO: 58.4 FL (ref 35.1–46.3)
ERYTHROCYTE [DISTWIDTH] IN BLOOD BY AUTOMATED COUNT: 16.1 % (ref 11–15)
GLUCOSE BLD-MCNC: 111 MG/DL (ref 70–99)
HAV IGM SER QL: 1.8 MG/DL (ref 1.6–2.6)
HCT VFR BLD AUTO: 33.4 % (ref 39–53)
HGB BLD-MCNC: 10.6 G/DL (ref 13–17.5)
LACTATE SERPL-SCNC: 1.7 MMOL/L (ref 0.4–2)
MCH RBC QN AUTO: 31.2 PG (ref 26–34)
MCHC RBC AUTO-ENTMCNC: 31.7 G/DL (ref 31–37)
MCV RBC AUTO: 98.2 FL (ref 80–100)
OSMOLALITY SERPL CALC.SUM OF ELEC: 309 MOSM/KG (ref 275–295)
PLATELET # BLD AUTO: 242 10(3)UL (ref 150–450)
POTASSIUM SERPL-SCNC: 3.3 MMOL/L (ref 3.5–5.1)
RBC # BLD AUTO: 3.4 X10(6)UL (ref 3.8–5.8)
SODIUM SERPL-SCNC: 142 MMOL/L (ref 136–145)
WBC # BLD AUTO: 20.1 X10(3) UL (ref 4–11)

## 2019-03-20 PROCEDURE — 97166 OT EVAL MOD COMPLEX 45 MIN: CPT

## 2019-03-20 PROCEDURE — 80048 BASIC METABOLIC PNL TOTAL CA: CPT | Performed by: INTERNAL MEDICINE

## 2019-03-20 PROCEDURE — 83605 ASSAY OF LACTIC ACID: CPT | Performed by: INTERNAL MEDICINE

## 2019-03-20 PROCEDURE — 97530 THERAPEUTIC ACTIVITIES: CPT

## 2019-03-20 PROCEDURE — 85027 COMPLETE CBC AUTOMATED: CPT | Performed by: INTERNAL MEDICINE

## 2019-03-20 PROCEDURE — 97162 PT EVAL MOD COMPLEX 30 MIN: CPT

## 2019-03-20 PROCEDURE — 83735 ASSAY OF MAGNESIUM: CPT | Performed by: HOSPITALIST

## 2019-03-20 RX ORDER — BUMETANIDE 1 MG/1
1 TABLET ORAL DAILY
Status: DISCONTINUED | OUTPATIENT
Start: 2019-03-20 | End: 2019-03-22

## 2019-03-20 RX ORDER — BISACODYL 10 MG
10 SUPPOSITORY, RECTAL RECTAL ONCE
Status: COMPLETED | OUTPATIENT
Start: 2019-03-20 | End: 2019-03-20

## 2019-03-20 RX ORDER — LEVOFLOXACIN 750 MG/1
750 TABLET ORAL DAILY
Status: DISCONTINUED | OUTPATIENT
Start: 2019-03-20 | End: 2019-03-22

## 2019-03-20 RX ORDER — COLCHICINE 0.6 MG/1
0.6 TABLET ORAL DAILY
Status: DISCONTINUED | OUTPATIENT
Start: 2019-03-20 | End: 2019-03-22

## 2019-03-20 RX ORDER — POTASSIUM CHLORIDE 20 MEQ/1
40 TABLET, EXTENDED RELEASE ORAL EVERY 4 HOURS
Status: DISCONTINUED | OUTPATIENT
Start: 2019-03-20 | End: 2019-03-20

## 2019-03-20 RX ORDER — MAGNESIUM OXIDE 400 MG (241.3 MG MAGNESIUM) TABLET
400 TABLET ONCE
Status: COMPLETED | OUTPATIENT
Start: 2019-03-20 | End: 2019-03-20

## 2019-03-20 RX ORDER — BISACODYL 10 MG
10 SUPPOSITORY, RECTAL RECTAL ONCE
Status: DISCONTINUED | OUTPATIENT
Start: 2019-03-20 | End: 2019-03-20

## 2019-03-20 NOTE — PROGRESS NOTES
Pulmonary Progress Note     Assessment / Plan:  1. Septic shock - urinary source  - off pressors  - LA normalized  - on merrem and vanc  - f/u cultures and narrow as able  2.  UTI - likely from malpositioned cleary  - cleary replaced  - abx and cultures as ab

## 2019-03-20 NOTE — PROGRESS NOTES
[facility] Hospitalist Progress Note     Reason for Admission: urosepsis  PCP: Waqas Mcconnell MD     Assessment/Plan:     Principal Problem:    Urinary tract infection associated with indwelling urethral catheter, initial encounter [facility])  Active Problems: dvt/pe  -resumed eliquis     Chronic cleary/urinary retention/BPH  -continue alfuzosin, cleary draining appropriately      Debility s/p R hip fracture/surgery  [facility]  -pt[facility]ot eval     Gout:  -continue allopurinol/colchicine     GOC Review:       Labs:     Recent Labs   Lab  03/18/19   1320  03/18/19   1650  03/19/19   0419  03/20/19   0413   RBC  4.42  4.16  3.57*  3.40*   HGB  13.9  13.1  11.2*  10.6*   HCT  43.5  41.5  35.5*  33.4*   MCV  98.4  99.8  99.4  98.2   MCH  31.4  31.5  3 the patient's physician, Dr. Mimi Piedra, by Dr. Huang Payton at [phone] on 03/18/2019.    Dictated by (CST): Katya Dunaway MD on 3/18/2019 at 18:38     Approved by (CST): Katya Dunaway MD on 3/18/2019 at 18:52          Xr Chest Ap Portable  (cpt=71045)    Result Da

## 2019-03-20 NOTE — PHYSICAL THERAPY NOTE
PHYSICAL THERAPY EVALUATION - INPATIENT     Room Number: 225/225-A  Evaluation Date: 3/20/2019  Type of Evaluation: Initial   Physician Order: PT Eval and Treat    Presenting Problem: UTI  Reason for Therapy: Mobility Dysfunction and Discharge Planning RECOMMENDATIONS  PT Discharge Recommendations: 24 hour care/supervision;Sub-acute rehabilitation    PLAN  PT Treatment Plan: Bed mobility; Body mechanics; Endurance; Energy conservation; Family education;Patient education;Strengthening;Transfer training;Balanc assist for all functional mobility and ADLs.  Patient was at times being transferred to chair via nicanor lift, other times, patient was attempting to transfer with assist and use of bathroom bar     SUBJECTIVE  Patient agreeable to therapy session     PHYSIC (G-Code): CM    FUNCTIONAL ABILITY STATUS  Gait Assessment   Gait Assistance: Not tested           Stoop/Curb Assistance: Not tested       Bed Mobility: Mod assist x 2    Transfers: NT     Exercise/Education Provided:  Bed mobility  Body mechanics  Functio

## 2019-03-20 NOTE — PLAN OF CARE
Problem: Patient Centered Care  Goal: Patient preferences are identified and integrated in the patient's plan of care  Interventions:  - What would you like us to know as we care for you?   - Provide timely, complete, and accurate information to patient/fa facilitate oxygenation and minimize respiratory effort  - Oxygen supplementation based on oxygen saturation or ABGs  - Provide Smoking Cessation handout, if applicable  - Encourage broncho-pulmonary hygiene including cough, deep breathe, Incentive Spiromet

## 2019-03-20 NOTE — OCCUPATIONAL THERAPY NOTE
OCCUPATIONAL THERAPY EVALUATION - INPATIENT     Room Number: 225/225-A  Evaluation Date: 3/20/2019  Type of Evaluation: Initial       Physician Order: IP Consult to Occupational Therapy  Reason for Therapy: ADL/IADL Dysfunction and Discharge Planning    OC [address]  OT Discharge Recommendations: Long term care;Sub-acute rehabilitation       PLAN  OT Treatment Plan: Balance activities; Energy conservation/work simplification techniques;ADL training;Functional transfer training;En w/c he is able to mobilize it on his own. He requires assistance for dressing, bathing, and toileting - although he does not get onto a toilet.      SUBJECTIVE  \"I don't know if I will walk again\"     OCCUPATIONAL THERAPY EXAMINATION      OBJECTIVE  Zakiya Ocasio questions and concerns addressed    OT Goals  Patients self stated goal is: to return to [facility]     Patient will complete functional transfer with mod a   Comment:     Patient will complete OFH in unsupported sitting with setup  Comment:

## 2019-03-20 NOTE — PLAN OF CARE
Problem: Integumentary status not within defined limits  Goal: Pt's integumentary status will be adequate for discharge  Outcome: Progressing  Encouraged patient to reposition self and use pillow support every 2 hours.      Problem: SKIN/TISSUE INTEGRITY - respiratory difficulty  - Respiratory Therapy support as indicated  - Manage/alleviate anxiety  - Monitor for signs/symptoms of CO2 retention  Outcome: Progressing  Patient is tolerating room air well without any complications.     Problem: CARDIOVASCULAR -

## 2019-03-21 LAB
ANION GAP SERPL CALC-SCNC: 7 MMOL/L (ref 0–18)
BUN BLD-MCNC: 35 MG/DL (ref 7–18)
BUN/CREAT SERPL: 50 (ref 10–20)
CALCIUM BLD-MCNC: 8.2 MG/DL (ref 8.5–10.1)
CHLORIDE SERPL-SCNC: 108 MMOL/L (ref 98–107)
CO2 SERPL-SCNC: 30 MMOL/L (ref 21–32)
CREAT BLD-MCNC: 0.7 MG/DL (ref 0.7–1.3)
DEPRECATED RDW RBC AUTO: 56.9 FL (ref 35.1–46.3)
ERYTHROCYTE [DISTWIDTH] IN BLOOD BY AUTOMATED COUNT: 16 % (ref 11–15)
GLUCOSE BLD-MCNC: 92 MG/DL (ref 70–99)
HAV IGM SER QL: 1.8 MG/DL (ref 1.6–2.6)
HCT VFR BLD AUTO: 34.1 % (ref 39–53)
HGB BLD-MCNC: 10.9 G/DL (ref 13–17.5)
MCH RBC QN AUTO: 31.2 PG (ref 26–34)
MCHC RBC AUTO-ENTMCNC: 32 G/DL (ref 31–37)
MCV RBC AUTO: 97.7 FL (ref 80–100)
OSMOLALITY SERPL CALC.SUM OF ELEC: 308 MOSM/KG (ref 275–295)
PLATELET # BLD AUTO: 264 10(3)UL (ref 150–450)
POTASSIUM SERPL-SCNC: 3.7 MMOL/L (ref 3.5–5.1)
RBC # BLD AUTO: 3.49 X10(6)UL (ref 3.8–5.8)
SODIUM SERPL-SCNC: 145 MMOL/L (ref 136–145)
WBC # BLD AUTO: 14 X10(3) UL (ref 4–11)

## 2019-03-21 PROCEDURE — 85027 COMPLETE CBC AUTOMATED: CPT | Performed by: INTERNAL MEDICINE

## 2019-03-21 PROCEDURE — 83735 ASSAY OF MAGNESIUM: CPT | Performed by: INTERNAL MEDICINE

## 2019-03-21 PROCEDURE — 80048 BASIC METABOLIC PNL TOTAL CA: CPT | Performed by: INTERNAL MEDICINE

## 2019-03-21 RX ORDER — POTASSIUM CHLORIDE 14.9 MG/ML
20 INJECTION INTRAVENOUS ONCE
Status: COMPLETED | OUTPATIENT
Start: 2019-03-21 | End: 2019-03-21

## 2019-03-21 RX ORDER — MAGNESIUM OXIDE 400 MG (241.3 MG MAGNESIUM) TABLET
400 TABLET ONCE
Status: COMPLETED | OUTPATIENT
Start: 2019-03-21 | End: 2019-03-21

## 2019-03-21 NOTE — PLAN OF CARE
Problem: Patient Centered Care  Goal: Patient preferences are identified and integrated in the patient's plan of care  Interventions:  - What would you like us to know as we care for you?  Patient from [facility] in [facility]  - Provide timely, complete, and accur behavior  - Position to facilitate oxygenation and minimize respiratory effort  - Oxygen supplementation based on oxygen saturation or ABGs  - Provide Smoking Cessation handout, if applicable  - Encourage broncho-pulmonary hygiene including cough, deep adele

## 2019-03-21 NOTE — CM/SW NOTE
Clinical updates sent via allscripts to [facility]. Pt indicated as contact isolation for MRSA.     [facility] [address], [place]W, [address]

## 2019-03-22 VITALS
TEMPERATURE: 98 F | RESPIRATION RATE: 18 BRPM | SYSTOLIC BLOOD PRESSURE: 109 MMHG | BODY MASS INDEX: 31 KG/M2 | WEIGHT: 241.19 LBS | HEART RATE: 66 BPM | OXYGEN SATURATION: 97 % | DIASTOLIC BLOOD PRESSURE: 61 MMHG

## 2019-03-22 LAB
ANION GAP SERPL CALC-SCNC: 6 MMOL/L (ref 0–18)
BUN BLD-MCNC: 28 MG/DL (ref 7–18)
BUN/CREAT SERPL: 38.9 (ref 10–20)
CALCIUM BLD-MCNC: 8.1 MG/DL (ref 8.5–10.1)
CHLORIDE SERPL-SCNC: 108 MMOL/L (ref 98–107)
CO2 SERPL-SCNC: 30 MMOL/L (ref 21–32)
CREAT BLD-MCNC: 0.72 MG/DL (ref 0.7–1.3)
DEPRECATED RDW RBC AUTO: 56.8 FL (ref 35.1–46.3)
ERYTHROCYTE [DISTWIDTH] IN BLOOD BY AUTOMATED COUNT: 15.8 % (ref 11–15)
GLUCOSE BLD-MCNC: 96 MG/DL (ref 70–99)
HAV IGM SER QL: 1.7 MG/DL (ref 1.6–2.6)
HCT VFR BLD AUTO: 34.8 % (ref 39–53)
HGB BLD-MCNC: 10.9 G/DL (ref 13–17.5)
MCH RBC QN AUTO: 30.8 PG (ref 26–34)
MCHC RBC AUTO-ENTMCNC: 31.3 G/DL (ref 31–37)
MCV RBC AUTO: 98.3 FL (ref 80–100)
OSMOLALITY SERPL CALC.SUM OF ELEC: 303 MOSM/KG (ref 275–295)
PLATELET # BLD AUTO: 243 10(3)UL (ref 150–450)
POTASSIUM SERPL-SCNC: 3.6 MMOL/L (ref 3.5–5.1)
RBC # BLD AUTO: 3.54 X10(6)UL (ref 3.8–5.8)
SODIUM SERPL-SCNC: 144 MMOL/L (ref 136–145)
WBC # BLD AUTO: 10.6 X10(3) UL (ref 4–11)

## 2019-03-22 PROCEDURE — 85027 COMPLETE CBC AUTOMATED: CPT | Performed by: INTERNAL MEDICINE

## 2019-03-22 PROCEDURE — 83735 ASSAY OF MAGNESIUM: CPT | Performed by: INTERNAL MEDICINE

## 2019-03-22 PROCEDURE — 80048 BASIC METABOLIC PNL TOTAL CA: CPT | Performed by: INTERNAL MEDICINE

## 2019-03-22 RX ORDER — ALFUZOSIN HYDROCHLORIDE 10 MG/1
10 TABLET, EXTENDED RELEASE ORAL
Qty: 30 TABLET | Refills: 0 | Status: ON HOLD | COMMUNITY
Start: 2019-03-23 | End: 2019-07-08

## 2019-03-22 RX ORDER — POTASSIUM CHLORIDE 20 MEQ/1
40 TABLET, EXTENDED RELEASE ORAL EVERY 4 HOURS
Status: COMPLETED | OUTPATIENT
Start: 2019-03-22 | End: 2019-03-22

## 2019-03-22 RX ORDER — SENNA AND DOCUSATE SODIUM 50; 8.6 MG/1; MG/1
2 TABLET, FILM COATED ORAL DAILY
Qty: 30 TABLET | Refills: 0 | Status: SHIPPED
Start: 2019-03-23

## 2019-03-22 RX ORDER — MAGNESIUM OXIDE 400 MG (241.3 MG MAGNESIUM) TABLET
400 TABLET ONCE
Status: COMPLETED | OUTPATIENT
Start: 2019-03-22 | End: 2019-03-22

## 2019-03-22 RX ORDER — LEVOFLOXACIN 750 MG/1
750 TABLET ORAL DAILY
Qty: 5 TABLET | Refills: 0 | Status: SHIPPED | OUTPATIENT
Start: 2019-03-23 | End: 2019-03-28

## 2019-03-22 NOTE — DISCHARGE PLANNING
Report called and given to Nurse, April at [facility] regarding patient being transferred to facility. Saline locks x 2 removed. Horton catheter in place. Personal belongings packed up and sent with patient.  Chart copied along with prescriptions

## 2019-03-22 NOTE — DISCHARGE SUMMARY
[facility] Hospitalist Discharge Summary   Patient ID:  Lorenza Rosenberg [id number]  18 year old 11/10/1928    Admit date: 3/18/2019  Discharge date: 3/22/2019  Risk of Readmission Lace+ Score: 67  59-90 High Risk  29-58 Medium Risk  0-28   Low Risk    Primary C Mr. Claudetta Gu is a 80year old male with PMH sig for a-fib, GERD, BPH, urinary retention with chronic cleary, prior PE/DVT,prior hemorrhoidal bleeding, HLD, debility s/p R hip fracture repair, HLD, aortic atherosclerosis with recent admission with [facility] -continue alfuzosin, cleary draining appropriately      Debility s/p R hip fracture/surgery  -long term resident [facility]     Gout:  -continue allopurinol/colchicine     GOC:  - patient DNR on admission  - did not want central line, can defer PICC at t Commonly known as:  NEURONTIN     guaiFENesin 100mg/5ml  Commonly known as:  ROBITUSSIN     KLOR-CON 20 MEQ Pack  Generic drug:  potassium chloride     Loperamide HCl 2 MG Caps  Commonly known as:  IMODIUM     METANX [phone] MG Caps     MIRALAX Powd

## 2019-03-22 NOTE — PLAN OF CARE
Phone call made to patient's neighbor who is listed as emergency contact. I spoke to Rob Musa to inform him that patient will be discharge on today and transferred back to [facility].

## 2019-03-22 NOTE — PROGRESS NOTES
[facility] Hospitalist Progress Note     Reason for Admission: urosepsis  PCP: Unique Ross MD     Assessment/Plan:     Principal Problem:    Urinary tract infection associated with indwelling urethral catheter, initial encounter [facility])  Active Problems: retention/BPH  -continue alfuzosin, cleary draining appropriately      Debility s/p R hip fracture/surgery  [facility]  -pt/ot eval     Gout:  -continue allopurinol/colchicine     GOC:  - patient DNR on admission  - did not want centr Recent Labs   Lab  03/18/19   1320  03/18/19   1650  03/19/19   0419  03/20/19   0413  03/21/19   0608   RBC  4.42  4.16  3.57*  3.40*  3.49*   HGB  13.9  13.1  11.2*  10.6*  10.9*   HCT  43.5  41.5  35.5*  33.4*  34.1*   MCV  98.4  99.8  99.4  98.2  9

## 2019-03-22 NOTE — PLAN OF CARE
Problem: Patient Centered Care  Goal: Patient preferences are identified and integrated in the patient's plan of care  Interventions:  - What would you like us to know as we care for you?  Patient from [facility] in [facility]  - Provide timely, complete, and accur as indicated  Outcome: Progressing    Goal: Oral mucous membranes remain intact  INTERVENTIONS  - Assess oral mucosa and hygiene practices  - Implement preventative oral hygiene regimen  - Implement oral medicated treatments as ordered  Outcome: Progressin baseline  INTERVENTIONS:  - Continuous cardiac monitoring, monitor vital signs, obtain 12 lead EKG if indicated  - Evaluate effectiveness of antiarrhythmic and heart rate control medications as ordered  - Initiate emergency measures for life threatening ar

## 2019-03-22 NOTE — CM/SW NOTE
12:04pm Update- RN clarified the pt is actually requiring overhead nicanor lift for transfers and cannot transport by wc due to that reason. Transport upgraded to an ambulance, [facility] aware.  PCS form completed and provided for medical records and transport

## 2019-07-03 ENCOUNTER — HOSPITAL ENCOUNTER (INPATIENT)
Facility: HOSPITAL | Age: 84
LOS: 4 days | Discharge: NURSING, REHAB OR LONG-TERM CARE | DRG: 698 | End: 2019-07-08
Attending: EMERGENCY MEDICINE | Admitting: HOSPITALIST
Payer: MEDICARE

## 2019-07-03 ENCOUNTER — APPOINTMENT (OUTPATIENT)
Dept: GENERAL RADIOLOGY | Facility: HOSPITAL | Age: 84
DRG: 698 | End: 2019-07-03
Attending: EMERGENCY MEDICINE
Payer: MEDICARE

## 2019-07-03 DIAGNOSIS — N17.9 ACUTE KIDNEY INJURY (HCC): ICD-10-CM

## 2019-07-03 DIAGNOSIS — N30.00 ACUTE CYSTITIS WITHOUT HEMATURIA: ICD-10-CM

## 2019-07-03 DIAGNOSIS — A41.9 SEPTIC SHOCK (HCC): Primary | ICD-10-CM

## 2019-07-03 DIAGNOSIS — R65.21 SEPTIC SHOCK (HCC): Primary | ICD-10-CM

## 2019-07-03 LAB
ANION GAP SERPL CALC-SCNC: 11 MMOL/L (ref 0–18)
APTT PPP: 36.4 SECONDS (ref 23.2–35.3)
BASOPHILS # BLD AUTO: 0.06 X10(3) UL (ref 0–0.2)
BASOPHILS NFR BLD AUTO: 0.2 %
BILIRUB UR QL: NEGATIVE
BUN BLD-MCNC: 48 MG/DL (ref 7–18)
BUN/CREAT SERPL: 18.3 (ref 10–20)
CALCIUM BLD-MCNC: 9.1 MG/DL (ref 8.5–10.1)
CHLORIDE SERPL-SCNC: 106 MMOL/L (ref 98–112)
CO2 SERPL-SCNC: 23 MMOL/L (ref 21–32)
COLOR UR: YELLOW
CREAT BLD-MCNC: 2.63 MG/DL (ref 0.7–1.3)
DEPRECATED RDW RBC AUTO: 56.6 FL (ref 35.1–46.3)
EOSINOPHIL # BLD AUTO: 0 X10(3) UL (ref 0–0.7)
EOSINOPHIL NFR BLD AUTO: 0 %
ERYTHROCYTE [DISTWIDTH] IN BLOOD BY AUTOMATED COUNT: 16.6 % (ref 11–15)
GLUCOSE BLD-MCNC: 157 MG/DL (ref 70–99)
GLUCOSE UR-MCNC: NEGATIVE MG/DL
HCT VFR BLD AUTO: 45.5 % (ref 39–53)
HGB BLD-MCNC: 14.8 G/DL (ref 13–17.5)
IMM GRANULOCYTES # BLD AUTO: 0.39 X10(3) UL (ref 0–1)
IMM GRANULOCYTES NFR BLD: 1.4 %
INR BLD: 1.13 (ref 0.9–1.2)
KETONES UR-MCNC: NEGATIVE MG/DL
LACTATE SERPL-SCNC: 5.7 MMOL/L (ref 0.4–2)
LYMPHOCYTES # BLD AUTO: 0.95 X10(3) UL (ref 1–4)
LYMPHOCYTES NFR BLD AUTO: 3.5 %
MCH RBC QN AUTO: 30.6 PG (ref 26–34)
MCHC RBC AUTO-ENTMCNC: 32.5 G/DL (ref 31–37)
MCV RBC AUTO: 94 FL (ref 80–100)
MONOCYTES # BLD AUTO: 1.74 X10(3) UL (ref 0.1–1)
MONOCYTES NFR BLD AUTO: 6.4 %
NEUTROPHILS # BLD AUTO: 24.06 X10 (3) UL (ref 1.5–7.7)
NEUTROPHILS # BLD AUTO: 24.06 X10(3) UL (ref 1.5–7.7)
NEUTROPHILS NFR BLD AUTO: 88.5 %
NITRITE UR QL STRIP.AUTO: NEGATIVE
OSMOLALITY SERPL CALC.SUM OF ELEC: 306 MOSM/KG (ref 275–295)
PH UR: 8 [PH] (ref 5–8)
PLATELET # BLD AUTO: 243 10(3)UL (ref 150–450)
POTASSIUM SERPL-SCNC: 4.9 MMOL/L (ref 3.5–5.1)
PROT UR-MCNC: 100 MG/DL
PROTHROMBIN TIME: 14.4 SECONDS (ref 11.8–14.5)
RBC # BLD AUTO: 4.84 X10(6)UL (ref 3.8–5.8)
RBC #/AREA URNS AUTO: 28 /HPF
SODIUM SERPL-SCNC: 140 MMOL/L (ref 136–145)
SP GR UR STRIP: 1.01 (ref 1–1.03)
UROBILINOGEN UR STRIP-ACNC: <2
VIT C UR-MCNC: NEGATIVE MG/DL
WBC # BLD AUTO: 27.2 X10(3) UL (ref 4–11)
WBC #/AREA URNS AUTO: 995 /HPF

## 2019-07-03 PROCEDURE — 83605 ASSAY OF LACTIC ACID: CPT | Performed by: EMERGENCY MEDICINE

## 2019-07-03 PROCEDURE — 85730 THROMBOPLASTIN TIME PARTIAL: CPT | Performed by: EMERGENCY MEDICINE

## 2019-07-03 PROCEDURE — 87077 CULTURE AEROBIC IDENTIFY: CPT | Performed by: EMERGENCY MEDICINE

## 2019-07-03 PROCEDURE — 80048 BASIC METABOLIC PNL TOTAL CA: CPT | Performed by: EMERGENCY MEDICINE

## 2019-07-03 PROCEDURE — 87150 DNA/RNA AMPLIFIED PROBE: CPT | Performed by: EMERGENCY MEDICINE

## 2019-07-03 PROCEDURE — 85025 COMPLETE CBC W/AUTO DIFF WBC: CPT | Performed by: EMERGENCY MEDICINE

## 2019-07-03 PROCEDURE — 99291 CRITICAL CARE FIRST HOUR: CPT

## 2019-07-03 PROCEDURE — 87086 URINE CULTURE/COLONY COUNT: CPT | Performed by: EMERGENCY MEDICINE

## 2019-07-03 PROCEDURE — 85610 PROTHROMBIN TIME: CPT | Performed by: EMERGENCY MEDICINE

## 2019-07-03 PROCEDURE — 96365 THER/PROPH/DIAG IV INF INIT: CPT

## 2019-07-03 PROCEDURE — 87186 SC STD MICRODIL/AGAR DIL: CPT | Performed by: EMERGENCY MEDICINE

## 2019-07-03 PROCEDURE — 96361 HYDRATE IV INFUSION ADD-ON: CPT

## 2019-07-03 PROCEDURE — 81001 URINALYSIS AUTO W/SCOPE: CPT | Performed by: EMERGENCY MEDICINE

## 2019-07-03 PROCEDURE — 93005 ELECTROCARDIOGRAM TRACING: CPT

## 2019-07-03 PROCEDURE — 87040 BLOOD CULTURE FOR BACTERIA: CPT | Performed by: EMERGENCY MEDICINE

## 2019-07-03 PROCEDURE — 87088 URINE BACTERIA CULTURE: CPT | Performed by: EMERGENCY MEDICINE

## 2019-07-03 PROCEDURE — 93010 ELECTROCARDIOGRAM REPORT: CPT | Performed by: EMERGENCY MEDICINE

## 2019-07-03 PROCEDURE — 71045 X-RAY EXAM CHEST 1 VIEW: CPT | Performed by: EMERGENCY MEDICINE

## 2019-07-04 ENCOUNTER — APPOINTMENT (OUTPATIENT)
Dept: ULTRASOUND IMAGING | Facility: HOSPITAL | Age: 84
DRG: 698 | End: 2019-07-04
Attending: HOSPITALIST
Payer: MEDICARE

## 2019-07-04 PROBLEM — N17.9 ACUTE KIDNEY INJURY (HCC): Status: ACTIVE | Noted: 2019-07-04

## 2019-07-04 PROBLEM — N30.00 ACUTE CYSTITIS WITHOUT HEMATURIA: Status: ACTIVE | Noted: 2019-07-04

## 2019-07-04 LAB
ALBUMIN SERPL-MCNC: 2.3 G/DL (ref 3.4–5)
ALBUMIN/GLOB SERPL: 0.6 {RATIO} (ref 1–2)
ALP LIVER SERPL-CCNC: 167 U/L (ref 45–117)
ALT SERPL-CCNC: 20 U/L (ref 16–61)
ANION GAP SERPL CALC-SCNC: 8 MMOL/L (ref 0–18)
AST SERPL-CCNC: 25 U/L (ref 15–37)
BASOPHILS # BLD: 0 X10(3) UL (ref 0–0.2)
BASOPHILS NFR BLD: 0 %
BILIRUB SERPL-MCNC: 0.6 MG/DL (ref 0.1–2)
BUN BLD-MCNC: 46 MG/DL (ref 7–18)
BUN/CREAT SERPL: 19.3 (ref 10–20)
CALCIUM BLD-MCNC: 8.2 MG/DL (ref 8.5–10.1)
CHLORIDE SERPL-SCNC: 110 MMOL/L (ref 98–112)
CO2 SERPL-SCNC: 24 MMOL/L (ref 21–32)
CREAT BLD-MCNC: 2.38 MG/DL (ref 0.7–1.3)
DEPRECATED RDW RBC AUTO: 62.2 FL (ref 35.1–46.3)
EOSINOPHIL # BLD: 0 X10(3) UL (ref 0–0.7)
EOSINOPHIL NFR BLD: 0 %
ERYTHROCYTE [DISTWIDTH] IN BLOOD BY AUTOMATED COUNT: 16.9 % (ref 11–15)
GLOBULIN PLAS-MCNC: 4 G/DL (ref 2.8–4.4)
GLUCOSE BLD-MCNC: 169 MG/DL (ref 70–99)
GLUCOSE BLDC GLUCOMTR-MCNC: 125 MG/DL (ref 70–99)
HAV IGM SER QL: 2 MG/DL (ref 1.6–2.6)
HCT VFR BLD AUTO: 45.1 % (ref 39–53)
HGB BLD-MCNC: 14.1 G/DL (ref 13–17.5)
INR BLD: 1.09 (ref 0.9–1.2)
LACTATE SERPL-SCNC: 2.6 MMOL/L (ref 0.4–2)
LACTATE SERPL-SCNC: 3.6 MMOL/L (ref 0.4–2)
LACTATE SERPL-SCNC: 3.7 MMOL/L (ref 0.4–2)
LACTATE SERPL-SCNC: 4.7 MMOL/L (ref 0.4–2)
LACTATE SERPL-SCNC: 5.4 MMOL/L (ref 0.4–2)
LYMPHOCYTES NFR BLD: 0.64 X10(3) UL (ref 1–4)
LYMPHOCYTES NFR BLD: 2 %
M PROTEIN MFR SERPL ELPH: 6.3 G/DL (ref 6.4–8.2)
MCH RBC QN AUTO: 31.2 PG (ref 26–34)
MCHC RBC AUTO-ENTMCNC: 31.3 G/DL (ref 31–37)
MCV RBC AUTO: 99.8 FL (ref 80–100)
METAMYELOCYTES # BLD: 0.32 X10(3) UL
METAMYELOCYTES NFR BLD: 1 %
MONOCYTES # BLD: 0.64 X10(3) UL (ref 0.1–1)
MONOCYTES NFR BLD: 2 %
MRSA DNA SPEC QL NAA+PROBE: POSITIVE
NEUTROPHILS # BLD AUTO: 28.58 X10 (3) UL (ref 1.5–7.7)
NEUTROPHILS NFR BLD: 78 %
NEUTS BAND NFR BLD: 17 %
NEUTS HYPERSEG # BLD: 30.4 X10(3) UL (ref 1.5–7.7)
OSMOLALITY SERPL CALC.SUM OF ELEC: 310 MOSM/KG (ref 275–295)
PLATELET # BLD AUTO: 208 10(3)UL (ref 150–450)
PLATELET MORPHOLOGY: NORMAL
POTASSIUM SERPL-SCNC: 4.9 MMOL/L (ref 3.5–5.1)
PROTHROMBIN TIME: 13.9 SECONDS (ref 11.8–14.5)
RBC # BLD AUTO: 4.52 X10(6)UL (ref 3.8–5.8)
SODIUM SERPL-SCNC: 142 MMOL/L (ref 136–145)
TOTAL CELLS COUNTED: 100
WBC # BLD AUTO: 32 X10(3) UL (ref 4–11)

## 2019-07-04 PROCEDURE — 87641 MR-STAPH DNA AMP PROBE: CPT | Performed by: INTERNAL MEDICINE

## 2019-07-04 PROCEDURE — 80053 COMPREHEN METABOLIC PANEL: CPT | Performed by: HOSPITALIST

## 2019-07-04 PROCEDURE — 83605 ASSAY OF LACTIC ACID: CPT | Performed by: EMERGENCY MEDICINE

## 2019-07-04 PROCEDURE — 85610 PROTHROMBIN TIME: CPT | Performed by: HOSPITALIST

## 2019-07-04 PROCEDURE — 83735 ASSAY OF MAGNESIUM: CPT | Performed by: HOSPITALIST

## 2019-07-04 PROCEDURE — 83605 ASSAY OF LACTIC ACID: CPT | Performed by: HOSPITALIST

## 2019-07-04 PROCEDURE — 82962 GLUCOSE BLOOD TEST: CPT

## 2019-07-04 PROCEDURE — 85007 BL SMEAR W/DIFF WBC COUNT: CPT | Performed by: HOSPITALIST

## 2019-07-04 PROCEDURE — 76770 US EXAM ABDO BACK WALL COMP: CPT | Performed by: HOSPITALIST

## 2019-07-04 PROCEDURE — 92610 EVALUATE SWALLOWING FUNCTION: CPT

## 2019-07-04 PROCEDURE — 85025 COMPLETE CBC W/AUTO DIFF WBC: CPT | Performed by: HOSPITALIST

## 2019-07-04 PROCEDURE — 85027 COMPLETE CBC AUTOMATED: CPT | Performed by: HOSPITALIST

## 2019-07-04 RX ORDER — SODIUM CHLORIDE 9 MG/ML
INJECTION, SOLUTION INTRAVENOUS CONTINUOUS
Status: DISCONTINUED | OUTPATIENT
Start: 2019-07-04 | End: 2019-07-04

## 2019-07-04 RX ORDER — SODIUM CHLORIDE 0.9 % (FLUSH) 0.9 %
3 SYRINGE (ML) INJECTION AS NEEDED
Status: DISCONTINUED | OUTPATIENT
Start: 2019-07-04 | End: 2019-07-08

## 2019-07-04 RX ORDER — HEPARIN SODIUM 5000 [USP'U]/ML
5000 INJECTION, SOLUTION INTRAVENOUS; SUBCUTANEOUS EVERY 12 HOURS SCHEDULED
Status: DISCONTINUED | OUTPATIENT
Start: 2019-07-04 | End: 2019-07-04

## 2019-07-04 RX ORDER — ACETAMINOPHEN 325 MG/1
650 TABLET ORAL EVERY 6 HOURS PRN
Status: DISCONTINUED | OUTPATIENT
Start: 2019-07-04 | End: 2019-07-08

## 2019-07-04 RX ORDER — METOCLOPRAMIDE HYDROCHLORIDE 5 MG/ML
5 INJECTION INTRAMUSCULAR; INTRAVENOUS EVERY 8 HOURS PRN
Status: DISCONTINUED | OUTPATIENT
Start: 2019-07-04 | End: 2019-07-08

## 2019-07-04 RX ORDER — POLYETHYLENE GLYCOL 3350 17 G/17G
17 POWDER, FOR SOLUTION ORAL DAILY PRN
Status: DISCONTINUED | OUTPATIENT
Start: 2019-07-04 | End: 2019-07-08

## 2019-07-04 RX ORDER — SODIUM CHLORIDE, SODIUM LACTATE, POTASSIUM CHLORIDE, CALCIUM CHLORIDE 600; 310; 30; 20 MG/100ML; MG/100ML; MG/100ML; MG/100ML
INJECTION, SOLUTION INTRAVENOUS CONTINUOUS
Status: DISCONTINUED | OUTPATIENT
Start: 2019-07-04 | End: 2019-07-05

## 2019-07-04 RX ORDER — PRAVASTATIN SODIUM 20 MG
20 TABLET ORAL DAILY
Status: DISCONTINUED | OUTPATIENT
Start: 2019-07-04 | End: 2019-07-08

## 2019-07-04 RX ORDER — SENNA AND DOCUSATE SODIUM 50; 8.6 MG/1; MG/1
2 TABLET, FILM COATED ORAL DAILY
Status: DISCONTINUED | OUTPATIENT
Start: 2019-07-04 | End: 2019-07-08

## 2019-07-04 RX ORDER — THIAMINE HCL 100 MG
400 TABLET ORAL
COMMUNITY
End: 2020-01-01

## 2019-07-04 RX ORDER — LACTULOSE 10 G/15ML
20 SOLUTION ORAL DAILY
COMMUNITY
End: 2020-01-01

## 2019-07-04 RX ORDER — BISACODYL 10 MG
10 SUPPOSITORY, RECTAL RECTAL
Status: DISCONTINUED | OUTPATIENT
Start: 2019-07-04 | End: 2019-07-08

## 2019-07-04 RX ORDER — POLYETHYLENE GLYCOL 3350 17 G/17G
17 POWDER, FOR SOLUTION ORAL DAILY PRN
Status: DISCONTINUED | OUTPATIENT
Start: 2019-07-04 | End: 2019-07-04

## 2019-07-04 RX ORDER — ONDANSETRON 2 MG/ML
4 INJECTION INTRAMUSCULAR; INTRAVENOUS EVERY 6 HOURS PRN
Status: DISCONTINUED | OUTPATIENT
Start: 2019-07-04 | End: 2019-07-08

## 2019-07-04 NOTE — CONSULTS
[facility]    Report of Consultation    Kalyn Barillas Patient Status:  Inpatient    11/10/1928 MRN [id number]   Location [facility] 2W/SW Attending Tita Ponce MD   Hosp Day # 0 PCP [place] Hendricks MD     Date not drink alcohol or use drugs.     Allergies:    Sulfa Antibiotics       UNKNOWN    Medications:    Current Facility-Administered Medications:   •  Piperacillin Sod-Tazobactam So (ZOSYN) 4.5 g in dextrose 5 % 100 mL ADD-vantage, 4.5 g, Intravenous, Q8H  • blood no hemorrhoids  PROSTATE:  SKIN without lesion  Normal gait      Laboratory Data:  Lab Results   Component Value Date    WBC 32.0 07/04/2019    HGB 14.1 07/04/2019    HCT 45.1 07/04/2019    .0 07/04/2019    CREATSERUM 2.38 07/04/2019    BUN 46

## 2019-07-04 NOTE — H&P
[facility] Hospitalist H&P       CC: Patient presents with:  Altered Mental Status (neurologic)       PCP: Chary No MD    Date of Admission: 7/3/2019  9:50 PM    ASSESSMENT / PLAN:     Mr. Mode Singh is a 80year old male with PMH sig for a-fib, GERD 1/19/19  -completed IV abx 2/4/19     GOC:  - patient DNR  - ok with pressors  - POA is friend Saminaabbe Ching    FN:  - IVF:  - Diet:    DVT Prophy:  Lines:    Dispo: pending clinical course    Outpatient records or previous hospital records reviewed.      Further rec Diagnosis Date   • A-fib [facility])    • Arrhythmia    • BPH (benign prostatic hyperplasia)    • Cellulitis and abscess of other specified site    • Cognitive communication deficit    • DVT (deep venous thrombosis) (HCC)    • Esophageal reflux    • Hypertroph Grossly normal, CN intact, sensory intact  Psych: Affect- normal  SKIN: warm, dry  EXT: bilateral chronic venous stasis changes     Diagnostic Data:    CBC/Chem    Recent Labs   Lab 07/03/19  2205 07/04/19  0512   RBC 4.84 4.52   HGB 14.8 14.1   HCT 45.5 4

## 2019-07-04 NOTE — PLAN OF CARE
Problem: Patient Centered Care  Goal: Patient preferences are identified and integrated in the patient's plan of care  Description  Interventions:  - What would you like us to know as we care for you?  \"I want to get better\"  - Provide timely, complete, status  Description  INTERVENTIONS  - Assess for and report changes in neurological status  - Initiate measures to prevent increased intracranial pressure  - Maintain blood pressure and fluid volume within ordered parameters to optimize cerebral perfusion

## 2019-07-04 NOTE — PROGRESS NOTES
ICU nocturnalist intake note: The patient is a 26-year-old male with history of atrial fibrillation and DVT/PE on Eliquis with chronic Horton who was transferred from nursing home with fever after exchange of Horton.   Upon arrival to the ICU, the patient

## 2019-07-04 NOTE — SLP NOTE
ADULT SWALLOWING EVALUATION    ASSESSMENT    ASSESSMENT/OVERALL IMPRESSION:  SLP BSSE orders received and acknowledged. A swallow evaluation warranted as pt admitted with altered diet consistency as medical binder reports \"Thickened liquids\".  Pt NPO unti Plan/Recommendations: Aspiration precautions; Dysphagia therapy  Discharge Recommendations/Plan: Undetermined    HISTORY   MEDICAL HISTORY  Reason for Referral: Altered diet consistency    Problem List  Principal Problem:    Septic shock ([address]  Active Probl Clear  Respiratory Status: Unlabored; Supplemental O2  Consistencies Trialed: Thin liquids; Nectar thick liquids; Soft solid;Hard solid;Puree  Method of Presentation: Self presentation;Staff/Clinician assistance;Spoon;Cup;Consecutive swallows;Single sips  Albino Braggadocio Number [phone]

## 2019-07-04 NOTE — ED NOTES
Sepsis note:     Total bolus volume 3270ml    Time Blood Cultures Drawn: 2240    Time first antibiotic started:  Ceftriaxone @ 2336    Initial lactate: 5.7  Repeat Lactate due:  0100

## 2019-07-04 NOTE — CONSULTS
[facility]:  [facility]    11/10/1928 MRN [id number]   [facility] 2W/SW Attending Martha Acuna, *   Hosp Day # 0 PCP Claudean Palmer, MD     Date of Admission: 7/3/2019  Admission D Relation Age of Onset   • Heart Disease Mother          Current Medications:  Reviewed in EMR     REVIEW OF SYSTEMS:   12 Point Review of Systems Negative except as mentioned in HPI.      OBJECTIVE:  BP 99/58 (BP Location: Right arm)   Pulse 56   Temp 97.2 urine outpt  4. IV Access:  Adequate peripheral IV's  -PICC if pressor requirements continue, currently weaning off  5. Proph  -SQ heparin  6.  Dispo  -ICU, pt is critically ill and at risk of decompensation  -DNR, pressors OK per family  Critical Care Time

## 2019-07-04 NOTE — ED NOTES
Cleary removed, excoriation 2.5x2cm to penis noted on dorsal surface, appears to be pressure related from cleary.   New 16f cleary inserted and UA obtained

## 2019-07-04 NOTE — ED NOTES
Dr Hieu Ambrose at bedside, pt's BP dropping despite 2L NS infusing. DNR form requested from [facility]. Pts POA contacted by Dr Hieu Ambrose.

## 2019-07-04 NOTE — PLAN OF CARE
Phlebotomist attempted to draw pt's lactic and AM labs with no success.  2nd Phlebotomist at bedside attempting to draw pt

## 2019-07-04 NOTE — PLAN OF CARE
Problem: RESPIRATORY - ADULT  Goal: Achieves optimal ventilation and oxygenation  Description  INTERVENTIONS:  - Assess for changes in respiratory status  - Assess for changes in mentation and behavior  - Position to facilitate oxygenation and minimize r appropriate  - Initiate Pressure Ulcer prevention bundle as indicated  Outcome: Progressing

## 2019-07-04 NOTE — ED PROVIDER NOTES
Patient Seen in: [facility] AND [facility] Emergency Department    History   Patient presents with:  Altered Mental Status (neurologic)    Stated Complaint: AMS, possinb    HPI    80year old with multiple medical problems including atrial fibrillation on Eliqui systems reviewed and negative except as noted above.     Physical Exam     ED Triage Vitals [07/03/19 2154]   BP (!) 81/52   Pulse 107   Resp 26   Temp 100.4 °F (38 °C)   Temp src Temporal   SpO2 91 %   O2 Device None (Room air)       Current:BP (!) 89/55 more than 1-2 word answers. Skin: Skin is warm and dry. Capillary refill takes less than 2 seconds. No rash noted. He is not diaphoretic. No erythema. No pallor. Nursing note and vitals reviewed.         ED Course     Labs Reviewed   URINALYSIS WITH CUL LACTIC ACID 3 HR POST POSITIVE   RAINBOW DRAW BLUE   RAINBOW DRAW LAVENDER   RAINBOW DRAW LIGHT GREEN   RAINBOW DRAW GOLD   BLOOD CULTURE   BLOOD CULTURE   URINE CULTURE, ROUTINE     EKG    Rate, intervals and axes as noted on EKG Report.   Rate: 109  Rhy Intravenous Stopped 7/4/19 0016)     When the nurse placed the catheter stated there was large mucus plug in catheter tract, the Horton was replaced with a fresh clean 1 and on replacement the patient put out 1 L of thick purulent appearing urine, likely ca including reassessment of your blood pressure.     Medications Prescribed:  Current Discharge Medication List              Present on Admission  Date Reviewed: 7/19/2018          ICD-10-CM Noted POA    Septic shock ([facility] [address] A41.9, R65.21 3/18/2019 Unknown

## 2019-07-04 NOTE — PROGRESS NOTES
[facility] Pharmacy Note:  Renal Dose Adjustment for Metoclopramide (REGLAN)    Jose D Scott has been prescribed Metoclopramide (REGLAN) 10 mg every 8 hours as needed for Nausea, vomiting.     Estimated Creatinine Clearance: 20.5 mL/min (A) (based on SCr of 2

## 2019-07-04 NOTE — PROGRESS NOTES
[facility] Pharmacy Note: Antimicrobial Weight Dose Adjustment for: piperacillin/tazobactam (Yoly Alba)    Blake Paul is a 80year old male who has been prescribed piperacillin/tazobactam (ZOSYN) 3.375 gm every 8 hours.   CrCl is estimated creatinine clearance

## 2019-07-04 NOTE — ED INITIAL ASSESSMENT (HPI)
Pt brought from [facility] by [facility] for AMS and possible UTI . Last known normal time was 2030.

## 2019-07-05 ENCOUNTER — APPOINTMENT (OUTPATIENT)
Dept: CT IMAGING | Facility: HOSPITAL | Age: 84
DRG: 698 | End: 2019-07-05
Attending: INTERNAL MEDICINE
Payer: MEDICARE

## 2019-07-05 LAB
ANION GAP SERPL CALC-SCNC: 7 MMOL/L (ref 0–18)
BUN BLD-MCNC: 43 MG/DL (ref 7–18)
BUN/CREAT SERPL: 30.9 (ref 10–20)
CALCIUM BLD-MCNC: 8.1 MG/DL (ref 8.5–10.1)
CHLORIDE SERPL-SCNC: 109 MMOL/L (ref 98–112)
CO2 SERPL-SCNC: 25 MMOL/L (ref 21–32)
CREAT BLD-MCNC: 1.39 MG/DL (ref 0.7–1.3)
DEPRECATED RDW RBC AUTO: 58.4 FL (ref 35.1–46.3)
ERYTHROCYTE [DISTWIDTH] IN BLOOD BY AUTOMATED COUNT: 16.7 % (ref 11–15)
GLUCOSE BLD-MCNC: 105 MG/DL (ref 70–99)
HCT VFR BLD AUTO: 37 % (ref 39–53)
HGB BLD-MCNC: 12.1 G/DL (ref 13–17.5)
LACTATE SERPL-SCNC: 1.7 MMOL/L (ref 0.4–2)
MCH RBC QN AUTO: 31.3 PG (ref 26–34)
MCHC RBC AUTO-ENTMCNC: 32.7 G/DL (ref 31–37)
MCV RBC AUTO: 95.6 FL (ref 80–100)
OSMOLALITY SERPL CALC.SUM OF ELEC: 303 MOSM/KG (ref 275–295)
PLATELET # BLD AUTO: 207 10(3)UL (ref 150–450)
POTASSIUM SERPL-SCNC: 4.1 MMOL/L (ref 3.5–5.1)
RBC # BLD AUTO: 3.87 X10(6)UL (ref 3.8–5.8)
SODIUM SERPL-SCNC: 141 MMOL/L (ref 136–145)
WBC # BLD AUTO: 16.6 X10(3) UL (ref 4–11)

## 2019-07-05 PROCEDURE — 97530 THERAPEUTIC ACTIVITIES: CPT

## 2019-07-05 PROCEDURE — 97166 OT EVAL MOD COMPLEX 45 MIN: CPT

## 2019-07-05 PROCEDURE — 80048 BASIC METABOLIC PNL TOTAL CA: CPT | Performed by: HOSPITALIST

## 2019-07-05 PROCEDURE — 92526 ORAL FUNCTION THERAPY: CPT

## 2019-07-05 PROCEDURE — 97162 PT EVAL MOD COMPLEX 30 MIN: CPT

## 2019-07-05 PROCEDURE — 99212 OFFICE O/P EST SF 10 MIN: CPT

## 2019-07-05 PROCEDURE — 85027 COMPLETE CBC AUTOMATED: CPT | Performed by: HOSPITALIST

## 2019-07-05 PROCEDURE — 83605 ASSAY OF LACTIC ACID: CPT | Performed by: INTERNAL MEDICINE

## 2019-07-05 PROCEDURE — 74176 CT ABD & PELVIS W/O CONTRAST: CPT | Performed by: INTERNAL MEDICINE

## 2019-07-05 RX ORDER — CLOTRIMAZOLE AND BETAMETHASONE DIPROPIONATE 10; .64 MG/G; MG/G
CREAM TOPICAL 2 TIMES DAILY
Status: DISCONTINUED | OUTPATIENT
Start: 2019-07-05 | End: 2019-07-08

## 2019-07-05 NOTE — CONSULTS
[facility]    PATIENT'S NAME: Betzy Mary   ATTENDING PHYSICIAN: Satnam Gr. George Duong MD   CONSULTING PHYSICIAN: Jasmeet Anglin MD   PATIENT ACCOUNT#:   [id number]    LOCATION:  [address] [place] [address] Road #:   [id number]       DATE OF bilaterally. LABORATORY DATA:  White count now has decreased to 16.6; hemoglobin 12.1; platelets 730,128. BUN and creatinine 43 and 1.39. Blood culture, PCR with Proteus, sensitivities officially pending. Urinalysis, active sediment.   Previous lactic

## 2019-07-05 NOTE — CONSULTS
Lashaun Justin is a 80year old male. Patient presents with:  Altered Mental Status (neurologic)      HPI:    Asked by dr Bryan Spencer to see  Chronic cleary x 1 yr    REVIEW OF SYSTEMS:   A comprehensive 11 point review of systems was completed.   Pertine Soft NT/ND, BS present, No masses , rebound, no HSM. left cva discomfort  EXTREMITIES:  le edema, no clubbing, no cyanosis, phlebitis or cellulitis. NEURO:  No focal neurologic deficits.   DERM:  Warm, dry, feet fungal derm and onychomycosis severe  IV Site (H) 70 - 99 mg/dL    Sodium 140 136 - 145 mmol/L    Potassium 4.9 3.5 - 5.1 mmol/L    Chloride 106 98 - 112 mmol/L    CO2 23.0 21.0 - 32.0 mmol/L    Anion Gap 11 0 - 18 mmol/L    BUN 48 (H) 7 - 18 mg/dL    Creatinine 2.63 (H) 0.70 - 1.30 mg/dL    BUN/CREA Neutrophil Absolute Manual 30.40 (H) 1.50 - 7.70 x10(3) uL    Lymphocyte Absolute Manual 0.64 (L) 1.00 - 4.00 x10(3) uL    Monocyte Absolute Manual 0.64 0.10 - 1.00 x10(3) uL    Eosinophil Absolute Manual 0.00 0.00 - 0.70 x10(3) uL    Basophil Absolute Man -American 51 (L) >=60   LACTIC ACID, PLASMA   Result Value Ref Range    Lactic Acid 1.7 0.4 - 2.0 mmol/L   POCT GLUCOSE   Result Value Ref Range    POC Glucose  125 (H) 70 - 99   RAINBOW DRAW BLUE   Result Value Ref Range    Hold Blue Auto Resulted Monocyte Absolute 1.74 (H) 0.10 - 1.00 x10(3) uL    Eosinophil Absolute 0.00 0.00 - 0.70 x10(3) uL    Basophil Absolute 0.06 0.00 - 0.20 x10(3) uL    Immature Granulocyte Absolute 0.39 0.00 - 1.00 x10(3) uL    Neutrophil % 88.5 %    Lymphocyte % 3.5 %    M

## 2019-07-05 NOTE — PROGRESS NOTES
[facility] Hospitalist Progress Note     CC: Hospital Follow up    PCP: Unique Ross MD       Assessment/Plan:     Principal Problem:    Septic shock [facility])  Active Problems:    Acute cystitis without hematuria    Acute kidney injury [facility])    Mr. 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

## 2019-07-05 NOTE — OCCUPATIONAL THERAPY NOTE
OCCUPATIONAL THERAPY EVALUATION - INPATIENT     Room Number: 204/204-A  Evaluation Date: 7/5/2019  Type of Evaluation: Initial  Presenting Problem: (septic shock)    Physician Order: IP Consult to Occupational Therapy  Reason for Therapy: ADL/IADL Dysfunct Short Form. Research supports that patients with this level of impairment may benefit from JOSH.  Pt will continue to benefit from skilled IP OT services while at [facility] in preparation for d/c.      DISCHARGE RECOMMENDATIONS  OT Discharge Recommendations: 24 h Home: 0  Use of Assistive Device(s): Rw and w/c     Prior Level of [place]: per pt report, he uses a rw to tfr from bed>w/c and assist from 1 person, reports is has been over a year since he walked - reports previous fall June 2018 and going to rehab, as brushing teeth?: A Little  -   Eating meals?: A Little    AM-PAC Score:  Score: 14  Approx Degree of Impairment: 59.67%  Standardized Score (AM-PAC Scale): 33.39  CMS Modifier (G-Code): CK    FUNCTIONAL TRANSFER ASSESSMENT  Supine to Sit : Moderate assi

## 2019-07-05 NOTE — PROGRESS NOTES
Critical Care Progress Note     Assessment / Plan:  1. Septic shock:  Secondary to UTI, hypovolemia  -cont IVF, s/p resuscitation  -off pressors   -cont abx  -trend LA to normal  -sepsis protocol completed  2.  ID:  UTI and bacteremia with recurrence, leuko

## 2019-07-05 NOTE — PLAN OF CARE
VSS. A/Ox4. Calm and cooperative. Denies pain. Denies SOB, on RA. On IVF. On contact isolation for MRSA. Horton intact and good Uo. No new issues and complaints. Call light within reach. Will cont to monitor.          Problem: Patient Centered Care  Goal: Progressing     Problem: SKIN/TISSUE INTEGRITY - ADULT  Goal: Skin integrity remains intact  Description  INTERVENTIONS  - Assess and document risk factors for pressure ulcer development  - Assess and document skin integrity  - Monitor for areas of redness

## 2019-07-05 NOTE — CM/SW NOTE
7/5: PALMIRA called Julio/LiaisonTan/Bakari, he confirmed patient arrives to [facility] from SNF. Updates sent via [employer]. PALMIRA left message for Emergency contact, [place] as a follow up.      Plan: Patient will return to [facility] when stabl

## 2019-07-05 NOTE — WOUND PROGRESS NOTE
WOUND CARE NOTE      PLAN   Recommendations:  Dietary consult for recommendations for nutrition to optimize wound healing  Turn schedules  Heels elevated using pillows, heel wedge or heel boots to offload heels  Use of lift equipment  To prevent sliding:

## 2019-07-05 NOTE — SLP NOTE
SPEECH DAILY NOTE - INPATIENT    ASSESSMENT & PLAN   ASSESSMENT  Pt seen upright 90 degrees in bed for meal assessment. Pt requests an upgrade to regular solids. RN reports pt tolerates AM meal and medication with no overt CSA.  SLP reviewed safe swallowing trials. SLP to f/u with full meal assessment x1 as able.        UPDATED    Goal #2 The patient will utilize compensatory strategies as outlined by  BSSE (clinical evaluation) including Slow rate, Small bites, Small sips, Alternate liquids/solids, No straws,

## 2019-07-05 NOTE — PHYSICAL THERAPY NOTE
PHYSICAL THERAPY EVALUATION - INPATIENT     Room Number: 204/204-A  Evaluation Date: 7/5/2019  Type of Evaluation: Initial   Physician Order: PT Eval and Treat    Presenting Problem: p/w AMS, septic shock d/t UTI, MICHA  Reason for Therapy: Mobility Dysfunc d/t deficits. PT recommendation sub-acute rehab at d/c to address deficits and return to prior level of function. Patient will benefit from continued IP PT services to address these deficits in preparation for discharge.     DISCHARGE RECOMMENDATIONS  PT per pt report, independent with bed mobility and w/c mobility. Pt has 1 assist for stand pivot transfers to his w/c with and without rolling walker, assist for bathing and dressing. Has not been able to ambulate since his hip fx in June, 2018.     Diego Diallo Degree of Impairment: 86.62%   Standardized Score (AM-PAC Scale): 28.58   CMS Modifier (G-Code): CM    FUNCTIONAL ABILITY STATUS  Gait Assessment   Gait Assistance: Not tested   Stoop/Curb Assistance: Not tested     Bed Mobility: supine to sit transfer at

## 2019-07-06 LAB
ALBUMIN SERPL-MCNC: 2.1 G/DL (ref 3.4–5)
ALBUMIN/GLOB SERPL: 0.5 {RATIO} (ref 1–2)
ALP LIVER SERPL-CCNC: 141 U/L (ref 45–117)
ALT SERPL-CCNC: 19 U/L (ref 16–61)
ALT SERPL-CCNC: 20 U/L (ref 16–61)
ANION GAP SERPL CALC-SCNC: 6 MMOL/L (ref 0–18)
ANION GAP SERPL CALC-SCNC: 6 MMOL/L (ref 0–18)
AST SERPL-CCNC: 28 U/L (ref 15–37)
BASOPHILS # BLD AUTO: 0.05 X10(3) UL (ref 0–0.2)
BASOPHILS NFR BLD AUTO: 0.4 %
BILIRUB SERPL-MCNC: 0.6 MG/DL (ref 0.1–2)
BILIRUB SERPL-MCNC: 0.7 MG/DL (ref 0.1–2)
BUN BLD-MCNC: 30 MG/DL (ref 7–18)
BUN BLD-MCNC: 30 MG/DL (ref 7–18)
BUN/CREAT SERPL: 28.3 (ref 10–20)
BUN/CREAT SERPL: 28.3 (ref 10–20)
CALCIUM BLD-MCNC: 8.2 MG/DL (ref 8.5–10.1)
CALCIUM BLD-MCNC: 8.2 MG/DL (ref 8.5–10.1)
CHLORIDE SERPL-SCNC: 110 MMOL/L (ref 98–112)
CHLORIDE SERPL-SCNC: 110 MMOL/L (ref 98–112)
CO2 SERPL-SCNC: 27 MMOL/L (ref 21–32)
CO2 SERPL-SCNC: 27 MMOL/L (ref 21–32)
CREAT BLD-MCNC: 1.06 MG/DL (ref 0.7–1.3)
CREAT BLD-MCNC: 1.06 MG/DL (ref 0.7–1.3)
DEPRECATED RDW RBC AUTO: 58.6 FL (ref 35.1–46.3)
ENTEROBACT DNA BLD POS QL NAA+NON-PROBE: DETECTED
EOSINOPHIL # BLD AUTO: 0.21 X10(3) UL (ref 0–0.7)
EOSINOPHIL NFR BLD AUTO: 1.9 %
ERYTHROCYTE [DISTWIDTH] IN BLOOD BY AUTOMATED COUNT: 16.5 % (ref 11–15)
GLOBULIN PLAS-MCNC: 4.1 G/DL (ref 2.8–4.4)
GLUCOSE BLD-MCNC: 95 MG/DL (ref 70–99)
GLUCOSE BLD-MCNC: 95 MG/DL (ref 70–99)
HCT VFR BLD AUTO: 37.7 % (ref 39–53)
HGB BLD-MCNC: 12.1 G/DL (ref 13–17.5)
IMM GRANULOCYTES # BLD AUTO: 0.07 X10(3) UL (ref 0–1)
IMM GRANULOCYTES NFR BLD: 0.6 %
LYMPHOCYTES # BLD AUTO: 1.56 X10(3) UL (ref 1–4)
LYMPHOCYTES NFR BLD AUTO: 13.9 %
M PROTEIN MFR SERPL ELPH: 6.2 G/DL (ref 6.4–8.2)
MCH RBC QN AUTO: 30.7 PG (ref 26–34)
MCHC RBC AUTO-ENTMCNC: 32.1 G/DL (ref 31–37)
MCV RBC AUTO: 95.7 FL (ref 80–100)
MONOCYTES # BLD AUTO: 0.92 X10(3) UL (ref 0.1–1)
MONOCYTES NFR BLD AUTO: 8.2 %
NEUTROPHILS # BLD AUTO: 8.42 X10 (3) UL (ref 1.5–7.7)
NEUTROPHILS # BLD AUTO: 8.42 X10(3) UL (ref 1.5–7.7)
NEUTROPHILS NFR BLD AUTO: 75 %
OSMOLALITY SERPL CALC.SUM OF ELEC: 302 MOSM/KG (ref 275–295)
OSMOLALITY SERPL CALC.SUM OF ELEC: 302 MOSM/KG (ref 275–295)
PLATELET # BLD AUTO: 211 10(3)UL (ref 150–450)
POTASSIUM SERPL-SCNC: 3.8 MMOL/L (ref 3.5–5.1)
PROTEUS SP DNA BLD POS QL NAA+NON-PROBE: DETECTED
RBC # BLD AUTO: 3.94 X10(6)UL (ref 3.8–5.8)
SODIUM SERPL-SCNC: 143 MMOL/L (ref 136–145)
SODIUM SERPL-SCNC: 143 MMOL/L (ref 136–145)
WBC # BLD AUTO: 11.2 X10(3) UL (ref 4–11)

## 2019-07-06 PROCEDURE — 85025 COMPLETE CBC W/AUTO DIFF WBC: CPT | Performed by: INTERNAL MEDICINE

## 2019-07-06 PROCEDURE — 85027 COMPLETE CBC AUTOMATED: CPT | Performed by: HOSPITALIST

## 2019-07-06 PROCEDURE — 80053 COMPREHEN METABOLIC PANEL: CPT | Performed by: HOSPITALIST

## 2019-07-06 NOTE — PLAN OF CARE
Problem: Patient Centered Care  Goal: Patient preferences are identified and integrated in the patient's plan of care  Description  Interventions:  - What would you like us to know as we care for you?  \"I want to get better\"   - Provide timely, complete stability  Description  INTERVENTIONS  - Assess for signs and symptoms of bleeding or hemorrhage  - Monitor labs and vital signs for trends  - Administer supportive blood products/factors, fluids and medications as ordered and appropriate  - Administer sup

## 2019-07-06 NOTE — PROGRESS NOTES
Transferred pt to telemetry room 331, no belongings, report called previously to 1138 Alessandra Calderón RN, no change status since. Skin check done w/DAVIE Mahmood, pt has 2 dried scabs upper R shin, 2 small stage 2 wounds coccyx, excoriation buttock.  Pt's friend/POA aware

## 2019-07-06 NOTE — PROGRESS NOTES
[facility] Hospitalist Progress Note     CC: Hospital Follow up    PCP: Chary No MD       Assessment/Plan:     Principal Problem:    Septic shock [facility])  Active Problems:    Acute cystitis without hematuria    Acute kidney injury [facility])    Mr. Mode Singh home meds     Prior dvt/pe  -eliquis     Chronic cleary/urinary retention/BPH  -holding home alfuzosin, cleary draining appropriately      Debility s/p R hip fracture/surgery  -long term resident [facility]     Gout:  -continue allopurinol/colchicine    no crackles or wheezes  CV: RRR, no murmurs  ABD: Soft, non-tender, non-distended, hypoactive BS  Neuro: Grossly normal, CN intact, sensory intact  Psych: Affect- normal  SKIN: warm, dry  EXT: bilateral chronic venous stasis changes       Data Review: region of the bladder. The urinary bladder is collapsed around a Horton catheter making it difficult to identify a discrete stone however the stones in the urinary bladder cover region measuring approximately 5 x 17 mm. 4.  Left adrenal adenoma.  5.  Renal

## 2019-07-06 NOTE — PLAN OF CARE
Problem: Patient Centered Care  Goal: Patient preferences are identified and integrated in the patient's plan of care  Description  Interventions:  - What would you like us to know as we care for you?   - Provide timely, complete, and accurate informatio integrity  - Monitor for areas of redness and/or skin breakdown  - Initiate interventions, skin care algorithm/standards of care as needed  Outcome: Progressing  Goal: Incision(s), wounds(s) or drain site(s) healing without S/S of infection  Description  I

## 2019-07-07 LAB
ANION GAP SERPL CALC-SCNC: 3 MMOL/L (ref 0–18)
BILIRUB UR QL: NEGATIVE
BUN BLD-MCNC: 22 MG/DL (ref 7–18)
BUN/CREAT SERPL: 22.9 (ref 10–20)
CALCIUM BLD-MCNC: 8.2 MG/DL (ref 8.5–10.1)
CHLORIDE SERPL-SCNC: 111 MMOL/L (ref 98–112)
CLARITY UR: CLEAR
CO2 SERPL-SCNC: 29 MMOL/L (ref 21–32)
COLOR UR: YELLOW
CREAT BLD-MCNC: 0.96 MG/DL (ref 0.7–1.3)
DEPRECATED RDW RBC AUTO: 58.4 FL (ref 35.1–46.3)
ERYTHROCYTE [DISTWIDTH] IN BLOOD BY AUTOMATED COUNT: 16.4 % (ref 11–15)
GLUCOSE BLD-MCNC: 99 MG/DL (ref 70–99)
GLUCOSE UR-MCNC: NEGATIVE MG/DL
HCT VFR BLD AUTO: 39.5 % (ref 39–53)
HGB BLD-MCNC: 12.8 G/DL (ref 13–17.5)
KETONES UR-MCNC: NEGATIVE MG/DL
MCH RBC QN AUTO: 31.1 PG (ref 26–34)
MCHC RBC AUTO-ENTMCNC: 32.4 G/DL (ref 31–37)
MCV RBC AUTO: 95.9 FL (ref 80–100)
NITRITE UR QL STRIP.AUTO: NEGATIVE
OSMOLALITY SERPL CALC.SUM OF ELEC: 299 MOSM/KG (ref 275–295)
PH UR: 5 [PH] (ref 5–8)
PLATELET # BLD AUTO: 223 10(3)UL (ref 150–450)
POTASSIUM SERPL-SCNC: 4 MMOL/L (ref 3.5–5.1)
PROT UR-MCNC: NEGATIVE MG/DL
RBC # BLD AUTO: 4.12 X10(6)UL (ref 3.8–5.8)
RBC #/AREA URNS AUTO: 16 /HPF
SODIUM SERPL-SCNC: 143 MMOL/L (ref 136–145)
SP GR UR STRIP: 1.02 (ref 1–1.03)
UROBILINOGEN UR STRIP-ACNC: <2
VIT C UR-MCNC: NEGATIVE MG/DL
WBC # BLD AUTO: 10 X10(3) UL (ref 4–11)
WBC #/AREA URNS AUTO: 30 /HPF

## 2019-07-07 PROCEDURE — 81001 URINALYSIS AUTO W/SCOPE: CPT | Performed by: INTERNAL MEDICINE

## 2019-07-07 PROCEDURE — 80048 BASIC METABOLIC PNL TOTAL CA: CPT | Performed by: HOSPITALIST

## 2019-07-07 PROCEDURE — 85027 COMPLETE CBC AUTOMATED: CPT | Performed by: HOSPITALIST

## 2019-07-07 NOTE — PROGRESS NOTES
[facility] Pharmacy Note: Antimicrobial Weight Dose Adjustment for: ceftriaxone (ROCEPHIN)    Sho Islas is a 80year old male who has been prescribed ceftriaxone (ROCEPHIN) 1000 mg every 24 hours.   CrCl is estimated creatinine clearance is 56.1 mL/min (ba

## 2019-07-07 NOTE — PROGRESS NOTES
[facility] Hospitalist Progress Note     CC: Hospital Follow up    PCP: Dagoberto Gonzalez MD       Assessment/Plan:     Principal Problem:    Septic shock [facility])  Active Problems:    Acute cystitis without hematuria    Acute kidney injury [facility])    Mr. James Bautista home metoprolol 12.5 mg BID due to hypotension and intermittent bradycardia     GERD, HLD  -cont home meds     Prior dvt/pe  -eliquis     Chronic cleary/urinary retention/BPH  -holding home alfuzosin, cleary draining appropriately      Debility s/p R hip fra kg)  01/18/19 2250 : 237 lb 9.6 oz (107.8 kg)  01/18/19 1615 : 223 lb 1.7 oz (101.2 kg)    EXAM:   GEN: elderly male in NAD  HEENT: EOMI  Neck: Supple  Pulm: CTAB, no crackles or wheezes  CV: RRR, no murmurs  ABD: Soft, non-tender, non-distended, hypoactiv there is no hydroureteronephrosis to suggest ureteral stone). There are multiple stones within the urinary bladder when correlated with preceding CT exam from 3/18/19, which demonstrated multiple discrete stones in a similar region of the bladder.   The ur

## 2019-07-07 NOTE — PROGRESS NOTES
[facility] AND [facility] Infectious Disease Progress Note    Lashaun Justin Patient Status:  Inpatient    11/10/1928 MRN [id number]   Location [facility] 3W/SW Attending Leonardo Mann MD   Hosp Day # 2 PCP MD Jaleesa Palma and lateral flexion of cervical spine. No JVD. Supple. Lungs: Clear to auscultation bilaterally. Cardiac: Regular rate and rhythm. No murmur. Abdomen:  Soft, non-distended, non-tender, with no rebound or guarding. No peritoneal signs. No ascites.   Cathcarlie Savers The urinary bladder is collapsed around a Horton catheter making it difficult to identify a discrete stone however the stones in the urinary bladder cover region measuring approximately 5 x 17 mm. 4.  Left adrenal adenoma. 5.  Renal cysts.   6.  Chronic co

## 2019-07-07 NOTE — PROGRESS NOTES
[facility] Infectious Disease Progress Note    Dario Wisdom Patient Status:  Inpatient    11/10/1928 MRN [id number]   Location [facility] 3W/SW Attending Vijay Early MD   Hosp Day # 3 PCP MD Jose Eduardo Dye and lateral flexion of cervical spine. No JVD. Supple. Lungs: Clear to auscultation bilaterally. Cardiac: Regular rate and rhythm. No murmur. Abdomen:  Soft, non-distended, non-tender, with no rebound or guarding. No peritoneal signs. No ascites.   Tiffany Noss PM

## 2019-07-08 VITALS
HEART RATE: 67 BPM | DIASTOLIC BLOOD PRESSURE: 77 MMHG | RESPIRATION RATE: 16 BRPM | BODY MASS INDEX: 34.96 KG/M2 | WEIGHT: 258.13 LBS | OXYGEN SATURATION: 97 % | SYSTOLIC BLOOD PRESSURE: 134 MMHG | TEMPERATURE: 98 F | HEIGHT: 72 IN

## 2019-07-08 LAB
ANION GAP SERPL CALC-SCNC: 5 MMOL/L (ref 0–18)
BUN BLD-MCNC: 19 MG/DL (ref 7–18)
BUN/CREAT SERPL: 24.1 (ref 10–20)
CALCIUM BLD-MCNC: 8.2 MG/DL (ref 8.5–10.1)
CHLORIDE SERPL-SCNC: 114 MMOL/L (ref 98–112)
CO2 SERPL-SCNC: 27 MMOL/L (ref 21–32)
CREAT BLD-MCNC: 0.79 MG/DL (ref 0.7–1.3)
DEPRECATED RDW RBC AUTO: 57 FL (ref 35.1–46.3)
ERYTHROCYTE [DISTWIDTH] IN BLOOD BY AUTOMATED COUNT: 16.2 % (ref 11–15)
GLUCOSE BLD-MCNC: 100 MG/DL (ref 70–99)
HCT VFR BLD AUTO: 38.4 % (ref 39–53)
HGB BLD-MCNC: 12.5 G/DL (ref 13–17.5)
MCH RBC QN AUTO: 31.2 PG (ref 26–34)
MCHC RBC AUTO-ENTMCNC: 32.6 G/DL (ref 31–37)
MCV RBC AUTO: 95.8 FL (ref 80–100)
OSMOLALITY SERPL CALC.SUM OF ELEC: 304 MOSM/KG (ref 275–295)
PLATELET # BLD AUTO: 240 10(3)UL (ref 150–450)
POTASSIUM SERPL-SCNC: 3.8 MMOL/L (ref 3.5–5.1)
RBC # BLD AUTO: 4.01 X10(6)UL (ref 3.8–5.8)
SODIUM SERPL-SCNC: 146 MMOL/L (ref 136–145)
WBC # BLD AUTO: 11.6 X10(3) UL (ref 4–11)

## 2019-07-08 PROCEDURE — 80048 BASIC METABOLIC PNL TOTAL CA: CPT | Performed by: HOSPITALIST

## 2019-07-08 PROCEDURE — 85027 COMPLETE CBC AUTOMATED: CPT | Performed by: HOSPITALIST

## 2019-07-08 RX ORDER — CEFUROXIME AXETIL 500 MG/1
500 TABLET ORAL 2 TIMES DAILY
Qty: 20 TABLET | Refills: 0 | Status: SHIPPED | OUTPATIENT
Start: 2019-07-09 | End: 2019-07-19

## 2019-07-08 RX ORDER — METOCLOPRAMIDE HYDROCHLORIDE 5 MG/ML
10 INJECTION INTRAMUSCULAR; INTRAVENOUS EVERY 8 HOURS PRN
Status: DISCONTINUED | OUTPATIENT
Start: 2019-07-08 | End: 2019-07-08

## 2019-07-08 RX ORDER — CLOTRIMAZOLE AND BETAMETHASONE DIPROPIONATE 10; .64 MG/G; MG/G
1 CREAM TOPICAL 2 TIMES DAILY
Qty: 1 TUBE | Refills: 0 | Status: SHIPPED | OUTPATIENT
Start: 2019-07-08 | End: 2020-01-01

## 2019-07-08 RX ORDER — POTASSIUM CHLORIDE 20 MEQ/1
40 TABLET, EXTENDED RELEASE ORAL ONCE
Status: COMPLETED | OUTPATIENT
Start: 2019-07-08 | End: 2019-07-08

## 2019-07-08 NOTE — PROGRESS NOTES
[facility] Pharmacy Note:  Renal Dose Adjustment for Metoclopramide (REGLAN)    Freedom Gutierres has been prescribed Metoclopramide (REGLAN) 10 mg every 8 hours as needed for n/v.    Estimated Creatinine Clearance: 68.2 mL/min (based on SCr of 0.79 mg/dL).     Hi

## 2019-07-08 NOTE — PROGRESS NOTES
[facility] AND [facility] Infectious Disease  Progress Note    Jagjit Naidu Patient Status:  Inpatient    11/10/1928 MRN [id number]   Location [facility] 3W/SW Attending Bev Ramos MD   Hosp Day # 4 PCP Chary No MD     Sub low-grade    4. Disposition - stable for d/c from ID. Rx on chart for cefuroxime 500mg p.o. BID x 10 more days. Patient will be going back to [facility].   D/w nursing.  >35min spent at patient's bedside today in examination and coordination of d/c plan of care

## 2019-07-08 NOTE — DISCHARGE SUMMARY
General Medicine Discharge Summary     Patient ID:  Dia Amos  80year old  11/10/1928    Admit date: 7/3/2019    Discharge date and time: 07/08/19    Attending Physician: Destin Salmon MD     Primary Care Physician: Clarke Bonner MD Mr. Ramona Mills is a 80year old male with PMH sig for a-fib, GERD, BPH, urinary retention with chronic cleary, prior PE/DVT,prior hemorrhoidal bleeding, HLD, debility s/p R hip fracture repair, HLD, aortic atherosclerosis, hx sunil's gangrene s/p I&D, cultu - continue eliquis, holding home metoprolol 12.5 mg BID due to hypotension and intermittent bradycardia     GERD, HLD  -cont home meds     Prior dvt/pe  -eliquis     Chronic cleary/urinary retention/BPH  -holding home alfuzosin, cleary draining appropriately CONCLUSION:  1.  Bibasilar pulmonary opacities likely representing atelectasis however correlate for symptoms of pneumonia. There are also tiny bibasilar pleural effusions. 2.  Coronary atherosclerosis.  3.  Two nonobstructing calculi are present in the lo apixaban 2.5 MG Tabs  Commonly known as:  ELIQUIS     balsam peru-castor oil Oint  Elana;ly to area     bumetanide 1 MG Tabs  Commonly known as:  BUMEX     colchicine 0.6 MG Tabs     docusate sodium 100 MG Caps  Commonly known as:  COLACE     KLOR-CON 20 MEQ Total Time Coordinating Care: Greater than 30 minutes    Patient had opportunity to ask questions and state understand and agree with therapeutic plan as outlined      Floyd Rider MD  [facility]

## 2019-07-08 NOTE — DISCHARGE PLANNING
I called and gave telephone report to nurse Carlos Carlos at [facility]. She is aware of patient's mobility status, current diet, oral antibiotic prescribed at discharge, and that a new cleary catheter was inserted here upon admission with recommendat

## 2019-07-08 NOTE — PLAN OF CARE
Problem: RESPIRATORY - ADULT  Goal: Achieves optimal ventilation and oxygenation  Description  INTERVENTIONS:  - Assess for changes in respiratory status  - Assess for changes in mentation and behavior  - Position to facilitate oxygenation and minimize r bundle as indicated  Outcome: Completed     Problem: HEMATOLOGIC - ADULT  Goal: Maintains hematologic stability  Description  INTERVENTIONS  - Assess for signs and symptoms of bleeding or hemorrhage  - Monitor labs and vital signs for trends  - Administer

## 2019-07-08 NOTE — CM/SW NOTE
The pt. Is scheduled to return to [facility] today 7/8 at 430p, via ambulance, due to inability to transfer or sit for duration of trip. The pt. Is aware and agreeable and the pt's nurse will contact the pt's POA.       PCS form is on the chart

## 2019-07-08 NOTE — PLAN OF CARE
Problem: Patient Centered Care  Goal: Patient preferences are identified and integrated in the patient's plan of care  Description  Interventions:  - What would you like us to know as we care for you?   - Provide timely, complete, and accurate informatio pressure  - Maintain blood pressure and fluid volume within ordered parameters to optimize cerebral perfusion and minimize risk of hemorrhage  - Monitor temperature, glucose, and sodium.  Initiate appropriate interventions as ordered  Outcome: Progressing

## 2020-01-01 ENCOUNTER — HOSPITAL ENCOUNTER (OUTPATIENT)
Facility: HOSPITAL | Age: 85
Setting detail: HOSPITAL OUTPATIENT SURGERY
Discharge: NURSING, REHAB OR LONG-TERM CARE | End: 2020-01-01
Attending: UROLOGY | Admitting: UROLOGY
Payer: MEDICARE

## 2020-01-01 ENCOUNTER — ANESTHESIA EVENT (OUTPATIENT)
Dept: SURGERY | Facility: HOSPITAL | Age: 85
End: 2020-01-01
Payer: MEDICARE

## 2020-01-01 ENCOUNTER — ANESTHESIA (OUTPATIENT)
Dept: SURGERY | Facility: HOSPITAL | Age: 85
End: 2020-01-01
Payer: MEDICARE

## 2020-01-01 VITALS
BODY MASS INDEX: 29.84 KG/M2 | HEIGHT: 75 IN | HEART RATE: 61 BPM | TEMPERATURE: 98 F | RESPIRATION RATE: 18 BRPM | WEIGHT: 240 LBS | SYSTOLIC BLOOD PRESSURE: 119 MMHG | OXYGEN SATURATION: 97 % | DIASTOLIC BLOOD PRESSURE: 65 MMHG

## 2020-01-01 DIAGNOSIS — N21.0 BLADDER STONE: ICD-10-CM

## 2020-01-01 PROCEDURE — 82365 CALCULUS SPECTROSCOPY: CPT | Performed by: UROLOGY

## 2020-01-01 PROCEDURE — 0TCB8ZZ EXTIRPATION OF MATTER FROM BLADDER, VIA NATURAL OR ARTIFICIAL OPENING ENDOSCOPIC: ICD-10-PCS | Performed by: UROLOGY

## 2020-01-01 PROCEDURE — 88300 SURGICAL PATH GROSS: CPT | Performed by: UROLOGY

## 2020-01-01 RX ORDER — ATENOLOL 25 MG/1
25 TABLET ORAL DAILY
COMMUNITY

## 2020-01-01 RX ORDER — MORPHINE SULFATE 10 MG/ML
6 INJECTION, SOLUTION INTRAMUSCULAR; INTRAVENOUS EVERY 10 MIN PRN
Status: DISCONTINUED | OUTPATIENT
Start: 2020-01-01 | End: 2020-01-01

## 2020-01-01 RX ORDER — HYDROCODONE BITARTRATE AND ACETAMINOPHEN 5; 325 MG/1; MG/1
1 TABLET ORAL AS NEEDED
Status: DISCONTINUED | OUTPATIENT
Start: 2020-01-01 | End: 2020-01-01

## 2020-01-01 RX ORDER — PROCHLORPERAZINE EDISYLATE 5 MG/ML
5 INJECTION INTRAMUSCULAR; INTRAVENOUS ONCE AS NEEDED
Status: DISCONTINUED | OUTPATIENT
Start: 2020-01-01 | End: 2020-01-01

## 2020-01-01 RX ORDER — SODIUM CHLORIDE, SODIUM LACTATE, POTASSIUM CHLORIDE, CALCIUM CHLORIDE 600; 310; 30; 20 MG/100ML; MG/100ML; MG/100ML; MG/100ML
INJECTION, SOLUTION INTRAVENOUS CONTINUOUS
Status: DISCONTINUED | OUTPATIENT
Start: 2020-01-01 | End: 2020-01-01

## 2020-01-01 RX ORDER — HYDROMORPHONE HYDROCHLORIDE 1 MG/ML
0.6 INJECTION, SOLUTION INTRAMUSCULAR; INTRAVENOUS; SUBCUTANEOUS EVERY 5 MIN PRN
Status: DISCONTINUED | OUTPATIENT
Start: 2020-01-01 | End: 2020-01-01

## 2020-01-01 RX ORDER — CEFAZOLIN SODIUM/WATER 2 G/20 ML
2 SYRINGE (ML) INTRAVENOUS ONCE
Status: DISCONTINUED | OUTPATIENT
Start: 2020-01-01 | End: 2020-01-01

## 2020-01-01 RX ORDER — METOPROLOL TARTRATE 5 MG/5ML
2.5 INJECTION INTRAVENOUS ONCE
Status: DISCONTINUED | OUTPATIENT
Start: 2020-01-01 | End: 2020-01-01

## 2020-01-01 RX ORDER — SODIUM PHOSPHATE, DIBASIC AND SODIUM PHOSPHATE, MONOBASIC 7; 19 G/133ML; G/133ML
1 ENEMA RECTAL ONCE AS NEEDED
COMMUNITY

## 2020-01-01 RX ORDER — CEFAZOLIN SODIUM/WATER 2 G/20 ML
2 SYRINGE (ML) INTRAVENOUS ONCE
Status: COMPLETED | OUTPATIENT
Start: 2020-01-01 | End: 2020-01-01

## 2020-01-01 RX ORDER — ONDANSETRON 2 MG/ML
4 INJECTION INTRAMUSCULAR; INTRAVENOUS ONCE AS NEEDED
Status: DISCONTINUED | OUTPATIENT
Start: 2020-01-01 | End: 2020-01-01

## 2020-01-01 RX ORDER — MORPHINE SULFATE 4 MG/ML
4 INJECTION, SOLUTION INTRAMUSCULAR; INTRAVENOUS EVERY 10 MIN PRN
Status: DISCONTINUED | OUTPATIENT
Start: 2020-01-01 | End: 2020-01-01

## 2020-01-01 RX ORDER — METHENAMINE HIPPURATE 1000 MG/1
1 TABLET ORAL 2 TIMES DAILY
COMMUNITY

## 2020-01-01 RX ORDER — HYDROMORPHONE HYDROCHLORIDE 1 MG/ML
0.4 INJECTION, SOLUTION INTRAMUSCULAR; INTRAVENOUS; SUBCUTANEOUS EVERY 5 MIN PRN
Status: DISCONTINUED | OUTPATIENT
Start: 2020-01-01 | End: 2020-01-01

## 2020-01-01 RX ORDER — NITROFURANTOIN 25; 75 MG/1; MG/1
100 CAPSULE ORAL 2 TIMES DAILY
Qty: 6 CAPSULE | Refills: 0 | Status: SHIPPED | OUTPATIENT
Start: 2020-01-01 | End: 2020-01-01

## 2020-01-01 RX ORDER — NALOXONE HYDROCHLORIDE 0.4 MG/ML
80 INJECTION, SOLUTION INTRAMUSCULAR; INTRAVENOUS; SUBCUTANEOUS AS NEEDED
Status: DISCONTINUED | OUTPATIENT
Start: 2020-01-01 | End: 2020-01-01

## 2020-01-01 RX ORDER — LOPERAMIDE HYDROCHLORIDE 2 MG/1
2 CAPSULE ORAL AS NEEDED
COMMUNITY

## 2020-01-01 RX ORDER — ACETAMINOPHEN 500 MG
1000 TABLET ORAL ONCE
Status: COMPLETED | OUTPATIENT
Start: 2020-01-01 | End: 2020-01-01

## 2020-01-01 RX ORDER — BISACODYL 10 MG
10 SUPPOSITORY, RECTAL RECTAL AS NEEDED
COMMUNITY

## 2020-01-01 RX ORDER — FAMOTIDINE 20 MG/1
20 TABLET ORAL ONCE
Status: COMPLETED | OUTPATIENT
Start: 2020-01-01 | End: 2020-01-01

## 2020-01-01 RX ORDER — HALOPERIDOL 5 MG/ML
0.25 INJECTION INTRAMUSCULAR ONCE AS NEEDED
Status: DISCONTINUED | OUTPATIENT
Start: 2020-01-01 | End: 2020-01-01

## 2020-01-01 RX ORDER — CASTOR OIL AND BALSAM, PERU 788; 87 MG/G; MG/G
OINTMENT TOPICAL AS NEEDED
COMMUNITY

## 2020-01-01 RX ORDER — LIDOCAINE HYDROCHLORIDE 10 MG/ML
INJECTION, SOLUTION EPIDURAL; INFILTRATION; INTRACAUDAL; PERINEURAL AS NEEDED
Status: DISCONTINUED | OUTPATIENT
Start: 2020-01-01 | End: 2020-01-01 | Stop reason: SURG

## 2020-01-01 RX ORDER — MORPHINE SULFATE 4 MG/ML
2 INJECTION, SOLUTION INTRAMUSCULAR; INTRAVENOUS EVERY 10 MIN PRN
Status: DISCONTINUED | OUTPATIENT
Start: 2020-01-01 | End: 2020-01-01

## 2020-01-01 RX ORDER — GENTAMICIN SULFATE 40 MG/ML
INJECTION, SOLUTION INTRAMUSCULAR; INTRAVENOUS AS NEEDED
Status: DISCONTINUED | OUTPATIENT
Start: 2020-01-01 | End: 2020-01-01 | Stop reason: SURG

## 2020-01-01 RX ORDER — METOCLOPRAMIDE 10 MG/1
10 TABLET ORAL ONCE
Status: DISCONTINUED | OUTPATIENT
Start: 2020-01-01 | End: 2020-01-01 | Stop reason: HOSPADM

## 2020-01-01 RX ORDER — HYDROCODONE BITARTRATE AND ACETAMINOPHEN 5; 325 MG/1; MG/1
2 TABLET ORAL AS NEEDED
Status: DISCONTINUED | OUTPATIENT
Start: 2020-01-01 | End: 2020-01-01

## 2020-01-01 RX ORDER — HYDROMORPHONE HYDROCHLORIDE 1 MG/ML
0.2 INJECTION, SOLUTION INTRAMUSCULAR; INTRAVENOUS; SUBCUTANEOUS EVERY 5 MIN PRN
Status: DISCONTINUED | OUTPATIENT
Start: 2020-01-01 | End: 2020-01-01

## 2020-01-01 RX ADMIN — CEFAZOLIN SODIUM/WATER 2 G: 2 G/20 ML SYRINGE (ML) INTRAVENOUS at 11:44:00

## 2020-01-01 RX ADMIN — LIDOCAINE HYDROCHLORIDE 50 MG: 10 INJECTION, SOLUTION EPIDURAL; INFILTRATION; INTRACAUDAL; PERINEURAL at 11:30:00

## 2020-01-01 RX ADMIN — GENTAMICIN SULFATE 240 MG: 40 INJECTION, SOLUTION INTRAMUSCULAR; INTRAVENOUS at 11:44:00

## 2020-10-02 PROBLEM — N21.0 BLADDER STONES: Status: ACTIVE | Noted: 2020-01-01

## 2020-10-12 PROBLEM — Z78.9 RESIDES IN LONG TERM CARE FACILITY: Status: ACTIVE | Noted: 2020-01-01

## 2020-10-12 PROBLEM — S72.001A CLOSED RIGHT HIP FRACTURE, INITIAL ENCOUNTER (HCC): Status: RESOLVED | Noted: 2018-06-08 | Resolved: 2020-01-01

## 2020-10-12 PROBLEM — Z87.440 HISTORY OF RECURRENT UTIS: Status: RESOLVED | Noted: 2019-03-18 | Resolved: 2020-01-01

## 2020-10-12 PROBLEM — N18.2 STAGE 2 CHRONIC KIDNEY DISEASE: Status: ACTIVE | Noted: 2020-01-01

## 2020-10-12 PROBLEM — J18.9 NOSOCOMIAL PNEUMONIA: Status: RESOLVED | Noted: 2019-03-18 | Resolved: 2020-01-01

## 2020-10-12 PROBLEM — N39.0 URINARY TRACT INFECTION ASSOCIATED WITH INDWELLING URETHRAL CATHETER (HCC): Status: RESOLVED | Noted: 2019-03-18 | Resolved: 2020-01-01
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
## 2020-10-12 PROBLEM — Z97.8 CHRONIC INDWELLING FOLEY CATHETER: Status: RESOLVED | Noted: 2019-07-04 | Resolved: 2020-01-01

## 2020-10-12 PROBLEM — K59.09 CHRONIC CONSTIPATION: Status: ACTIVE | Noted: 2020-01-01

## 2020-10-12 PROBLEM — N39.0 URINARY TRACT INFECTION ASSOCIATED WITH INDWELLING URETHRAL CATHETER, INITIAL ENCOUNTER (HCC): Status: RESOLVED | Noted: 2019-03-18 | Resolved: 2020-01-01

## 2020-10-12 PROBLEM — A41.9 SEPTIC SHOCK (HCC): Status: RESOLVED | Noted: 2019-03-18 | Resolved: 2020-01-01

## 2020-10-12 PROBLEM — N18.31 STAGE 3A CHRONIC KIDNEY DISEASE (HCC): Status: ACTIVE | Noted: 2020-01-01

## 2020-10-12 PROBLEM — Z99.89 USES ROLLER WALKER: Status: ACTIVE | Noted: 2020-01-01

## 2020-10-12 PROBLEM — T83.511A URINARY TRACT INFECTION ASSOCIATED WITH INDWELLING URETHRAL CATHETER (HCC): Status: RESOLVED | Noted: 2019-03-18 | Resolved: 2020-01-01

## 2020-10-12 PROBLEM — Z79.01 WARFARIN ANTICOAGULATION: Status: RESOLVED | Noted: 2018-06-08 | Resolved: 2020-01-01

## 2020-10-12 PROBLEM — N49.2 CELLULITIS OF SCROTUM: Status: RESOLVED | Noted: 2019-01-18 | Resolved: 2020-01-01

## 2020-10-12 PROBLEM — Z97.8 CHRONIC INDWELLING FOLEY CATHETER: Status: ACTIVE | Noted: 2019-07-04

## 2020-10-12 PROBLEM — Z87.39 HISTORY OF GOUT: Status: ACTIVE | Noted: 2020-01-01

## 2020-10-12 PROBLEM — R65.21 SEPTIC SHOCK (HCC): Status: RESOLVED | Noted: 2019-03-18 | Resolved: 2020-01-01

## 2020-10-12 PROBLEM — T83.511A URINARY TRACT INFECTION ASSOCIATED WITH INDWELLING URETHRAL CATHETER, INITIAL ENCOUNTER (HCC): Status: RESOLVED | Noted: 2019-03-18 | Resolved: 2020-01-01

## 2020-10-12 PROBLEM — E78.5 HYPERLIPIDEMIA: Status: RESOLVED | Noted: 2018-06-14 | Resolved: 2020-01-01

## 2020-11-30 NOTE — ANESTHESIA POSTPROCEDURE EVALUATION
Patient: Marialuisa Garner    Procedure Summary     Date: 11/30/20 Room / Location: [address] [place] [address] MAIN OR 14 / [address] [place] [address] MAIN OR    Anesthesia Start: 0785 Anesthesia Stop: 1218    Procedure: CYSTOSCOPY (N/A ) Diagnosis:       Bladder stone      (Bladder stone [N21.0])

## 2020-11-30 NOTE — ANESTHESIA PREPROCEDURE EVALUATION
Anesthesia PreOp Note    HPI:     Pablo Tsai is a 80year old male who presents for preoperative consultation requested by: Ana Cruz MD    Date of Surgery: 11/30/2020    Procedure(s):  CYSTOSCOPY  LASER HOLMIUM LITHOTRIPSY  Indication: Bladder s • Muscle weakness (generalized)    • Osteoarthritis    • Other and unspecified hyperlipidemia    • Pulmonary embolism [facility])    • Thoracic aortic atherosclerosis (HCC)    • Unspecified essential hypertension    • Visual impairment        Past Surgical Histo •  metolazone 2.5 MG Oral Tab, Take 2.5 mg by mouth. Every two weeks - edema 1 hour give before bumex , Disp: , Rfl: , Past Week at Unknown time    •  Potassium Chloride ER 20 MEQ Oral Tab CR, Take 20 mEq by mouth daily.   , Disp: , Rfl: , 11/29/2020 at 090 Problem Relation Age of Onset   • Heart Disease Mother      Social History    Socioeconomic History      Marital status:        Spouse name: Not on file      Number of children: Not on file      Years of education: Not on file      Highest education  11/12/2020     09/23/2020    K 4.3 11/12/2020    K 4.4 09/23/2020     11/12/2020     09/23/2020    CO2 31.0 11/12/2020    CO2 32 09/23/2020    BUN 23 (H) 11/12/2020    BUN 24 09/23/2020    CREATSERUM 0.91 11/12/2020     (H)

## 2020-11-30 NOTE — H&P
[facility] Patient Status:  Hospital Outpatient Surgery    11/10/1928 MRN [id number]   Location [facility] PRE OP RECOVERY Attending Ana Monsivais MD   Hosp Day # 0 PCP Julieta Velasco MD Take 2 mg by mouth as needed for Diarrhea., Disp: , Rfl:     •  bisacodyl (DULCOLAX) 10 MG Rectal Suppos, Place 10 mg rectally as needed. , Disp: , Rfl:     •  Fleet Enema 7-19 GM/118ML Rectal Enema, Place 1 enema rectally once as needed. , Disp: , Rfl: Tab, Take 0.6 mg by mouth 2 (two) times daily. , Disp: , Rfl:     •  allopurinol 100 MG Oral Tab, Take 100 mg by mouth 2 (two) times daily.   , Disp: , Rfl:     •  balsam peru-castor oil External Ointment, Elana;ly to area (Patient taking differently: Apply walker      81 yo male  BPH with obstructive voiding, urinary retention   Managed with IFC  Bladder stones   Plan for cystolitholapaxy today   Continue cleary catheter   Preop abx written  Recurrent UTI   Reviewed cultures   Plan for ancef/gent    Home to n Include Location In Plan?: No Detail Level: Generalized

## 2020-11-30 NOTE — ANESTHESIA PROCEDURE NOTES
Airway  Date/Time: 11/30/2020 11:43 AM  Urgency: Elective    Difficult airway    General Information and Staff    Patient location during procedure: OR  Anesthesiologist: Analy Goldstein MD  Performed: anesthesiologist     Indications and Patient Condition

## 2020-11-30 NOTE — OPERATIVE REPORT
[facility]    Patients Name: Pablo Paul  Attending Physician: Taisha Garcia MD  CSN: [id number]    Location:  [address] [place] [address] OR [facility] OR  MRN: [id number]    YOB: 1928  Admission Date: 11/30/2020     Operative Note    Patient Name: cleary every 3 weeks    Max Rider

## 2021-01-01 DIAGNOSIS — Z23 NEED FOR VACCINATION: ICD-10-CM

## 2021-04-20 NOTE — PROGRESS NOTES
[facility]    Progress Note    Messi Lama Patient Status:  Inpatient    11/10/1928 MRN [id number]   Location [facility] 3W/SW Attending Charles Will MD   Hosp Day # 5 PCP Aminah Spain MD     Subjective:  Russ Adrian Spironolactone Counseling: Patient advised regarding risks of diarrhea, abdominal pain, hyperkalemia, birth defects (for female patients), liver toxicity and renal toxicity. The patient may need blood work to monitor liver and kidney function and potassium levels while on therapy. The patient verbalized understanding of the proper use and possible adverse effects of spironolactone.  All of the patient's questions and concerns were addressed.

## (undated) DEVICE — UROLOGY DRAIN BAG

## (undated) DEVICE — TRAY SRGPRP PVP IOD WT SCRB SM

## (undated) DEVICE — SPONGE LAP 18X18 XRAY STRL

## (undated) DEVICE — HIP PINNING: Brand: MEDLINE INDUSTRIES, INC.

## (undated) DEVICE — GAUZE SPONGES,12 PLY: Brand: CURITY

## (undated) DEVICE — Device

## (undated) DEVICE — BATTERY

## (undated) DEVICE — STERILE LATEX POWDER-FREE SURGICAL GLOVESWITH NITRILE COATING: Brand: PROTEXIS

## (undated) DEVICE — SUTURE VICRYL 2-0 FS-1

## (undated) DEVICE — EYE PADSSTERILENOT MADE WITH NATURAL RUBBER LATEXSINGLE USE ONLYDO NOT USE IF PACKAGE OPENED OR DAMAGED: Brand: CARDINAL HEALTH

## (undated) DEVICE — WEBRIL COTTON UNDERCAST PADDING: Brand: WEBRIL

## (undated) DEVICE — SOL H2O 3000ML IRRIG

## (undated) DEVICE — 60 ML SYRINGE,TOOMEY TYPE: Brand: MONOJECT

## (undated) DEVICE — MEDI-VAC NON-CONDUCTIVE SUCTION TUBING: Brand: CARDINAL HEALTH

## (undated) DEVICE — BAG DRAIN INFECTION CNTRL 2000

## (undated) DEVICE — CYSTO PACK: Brand: MEDLINE INDUSTRIES, INC.

## (undated) DEVICE — COVER SGL STRL LGHT HNDL BLU

## (undated) DEVICE — SOL  .9 1000ML BTL

## (undated) DEVICE — REM POLYHESIVE ADULT PATIENT RETURN ELECTRODE: Brand: VALLEYLAB

## (undated) DEVICE — 1010 S-DRAPE TOWEL DRAPE 10/BX: Brand: STERI-DRAPE™

## (undated) DEVICE — ISOVUE 300 10X100ML VIAL

## (undated) DEVICE — TOWEL OR BLU 16X26 STRL

## (undated) DEVICE — SOL  .9 3000ML

## (undated) DEVICE — OCCLUSIVE GAUZE STRIP OVERWRAP,3% BISMUTH TRIBROMOPHENATE IN PETROLATUM BLEND: Brand: XEROFORM

## (undated) DEVICE — SOL H2O 1000ML BTL

## (undated) DEVICE — 3M™ MICROFOAM™ TAPE 1528-4: Brand: 3M™ MICROFOAM™

## (undated) DEVICE — PROXIMATE SKIN STAPLERS (35 WIDE) CONTAINS 35 STAINLESS STEEL STAPLES (FIXED HEAD): Brand: PROXIMATE

## (undated) DEVICE — SUTURE VICRYL 0 CP-1

## (undated) DEVICE — STERILE POLYISOPRENE POWDER-FREE SURGICAL GLOVES: Brand: PROTEXIS

## (undated) DEVICE — GAMMEX® PI HYBRID SIZE 7.5, STERILE POWDER-FREE SURGICAL GLOVE, POLYISOPRENE AND NEOPRENE BLEND: Brand: GAMMEX

## (undated) NOTE — IP AVS SNAPSHOT
Patient Demographics     Address  [address]   [facility] [address] Phone  [phone] [facility])  [phone] [facility]      Emergency Contact(s)     Name Relation Home Work 72 Abimaele Pain Lee [phone]        Allergies as Contact information:  [address] [facility] [address]                  Your medication list      TAKE these medications       Instructions Authorizing Provider Morning Afternoon Evening As Needed   Alfuzosin HCl ER 10 MG T Warfarin Sodium 5 MG Tabs  Commonly known as:  COUMADIN      5 mg nightly, adjust based on INR   Marissa Herrera NP               Where to Get Your Medications      Please  your prescriptions at the location directed by your doctor or nurse Patient's Most Recent Weight    Flowsheet Row Most Recent Value   Patient Weight  105.7 kg (233 lb 1.6 oz)         Lab Results Last 24 Hours      BASIC METABOLIC PANEL (8) [[id number]] (Abnormal)  Resulted: 06/19/18 0554, Result status: Final result   Order the monitoring of oral anticoagulant therapy. Recommended therapeutic ranges for anticoagulant therapy are   as follows:   2.0 - 3.0 All indications except for mechanical prosthetic   cardiac valves. 2.5 - 3.5 Mechanical prosthetic cardiac valves. Aerobic Culture Result Mixture of organisms suggestive of normal skin vinh      No Staph aureus, Beta Strep or Pseudo aeruginosa isolated     Aerobic Smear No WBCs seen      No organisms seen    Clostridium difficile(toxigenic)PCR Once [[id number]]  (Nor -right hip wound cx[GB. 1] but does not look actively infected[GB.3]  -R/O C diff[GB.2]  -IVF[GB.1], cautious with known CMP[GB.2]  -[GB. 1]zosyn x 1 given in ER, will hold off on further[GB. 2] abx[GB.3]    Hypotension[GB. 1]-BP improved at hospital[GB.2]  -r who now presents with fever and hypotension[GB.1]    Pt was D/C from [place] [id number] on 6/11 after having right hip pinning for hip fx.  Pt states that he was having some issues with[GB.2] \"being out of it\"[GB.3] , temperatures up to 102 and lower BP readings in • Cognitive communication deficit    • DVT (deep venous thrombosis) (HCC)    • Esophageal reflux    • Hypertrophy of prostate without urinary obstruction and other lower urinary tract symptoms (LUTS)    • Muscle weakness (generalized)    • Osteoarthritis BUN  13  14  17  18  21*   CREATSERUM  0.94  0.95  0.75  0.96  1.00   GLU  108*  151*  108*  144*  163*   CA  8.7  8.1*  7.8*  8.1*  8.4*   MG   --    --   1.7*   --    --        No results for input(s): ALT, AST, ALB, AMYLASE, LIPASE, LDH in the last 168 Abd: Abdomen soft, nontender, nondistended, no organomegaly, bowel sounds present  MSK:  no clubbing, no cyanosis. Post op right hip, some serosang drainage, no warmth. erythema  Skin: no rashes or lesions, well perfused  Psych: mood stable, cooperative  N JH.2 - Juma Cali MD on 6/14/2018  5:18 PM               H&P signed by Juma Cali MD at 6/14/2018  5:16 PM  Version 3 of 4    Author:  Juma Cali MD Service:  Hospitalist Author Type:  Physician    Filed:  6/14/2018  5:16 PM Date of Se -DVT- Warfarin[GB.2]  -as per ortho    Hypokalemia  -replete K      NSVT/AFib[GB. 1]/CMP-EF 45-50%/MR[GB.2]  -tele  -beta blocker  -warfarin[GB. 1] dose 10 mg at rehab, per pt was previously on 7.5 mg,[GB. 2] D/C INR on 6/11 was 1.3,[GB. 3]  admission INR 1.8 Received call from Erika Santillan the midlevel at [facility] and he states he sent the pt as he the pt had several episodes this am of decreased responsiveness associated with loss of pulse for 10-15 seconds associated with apnea.  The midlevel states he thoug 2-15-12: OTHER SURGICAL HISTORY      Comment: flow/us-Dr. Newsome[GB.5]     ALL:[GB.1]    Sulfa Antibiotics       UNKNOWN[GB.5]     Home Medications:[GB.1]    No outpatient prescriptions have been marked as taking for the 6/14/18 encounter [facility] Atherosclerosis. 5. Scarring/atelectasis. 6. Hyperinflation. 7. Demineralization. 8. Scoliosis. 9. Osteoarthritis. Dictated by (CST): Olivia Rowe MD on 6/14/2018 at 11:53     Approved by (CST):  Olivia Rowe MD on 6/14/2018 at 11:56[GB.5] confusion/ unresponsiveness associated with loss of pulse of 10-15 seconds as well as apnea, see below for details     PreSyncope/Syncope  -EKG-SR with PAC's, LVH  -troponin  -tele->multiple and frequent runs of NSVT , pt with no syncope/presyncope here HL, recent fall and hip fracture S/p right hip pinning who was D/C 6/11 who now presents with fever[GB.1],[GB.3] hypotension[GB. 1] and liquid stools per pt.  Per Mid level at [facility] pt was sent for eval due to periods of confusion/ unresponsiveness assoc PCP:[GB.1] Sebastián Renteria MD[GB.5]      Concerns regarding plan of care were discussed with patient. Patient agrees with plan as detailed above. Discussed plan of care with [GB.1] Saravanan[GB.2]    Fabian Hung RN, N[GB. 1]P[GB. 2]  [facility] SKIN: no rashes  HEENT: normocephalic; normal nose, pharynx and TM's; PERRLA, EOMI, sclera anicteric, conjunctiva normal  NECK: supple; no JVD; no carotid bruits   RESPIRATORY: normal expansion; non labored; CTA   CARDIOVASCULAR: regular,nl S1 S2; no murmu 2000  06/09/18   0446  06/09/18   0940  06/10/18   0419  06/11/18   0423  06/12/18   1500  06/14/18   1120  06/14/18   1124   WBC  5.8   --    --   13.2*  11.5*  11.2*   --   13.6*   HGB  13.9   --    --   9.6*  8.8*  9.7*   --   10.2*   MCV  93.1   -- H&P signed by Mounika Morataya NP at 6/14/2018  2:28 PM  Version 1 of 4    Author:  Mounika Morataya NP Service:  (none) Author Type:  Nurse Practitioner    Filed:  6/14/2018  2:28 PM Date of Service:  6/14/2018  2:28 PM Status:  Signed    :  Ledy Esquivel PCP:[GB.1] Robe Hand MD[GB.4]      Concerns regarding plan of care were discussed with patient. Patient agrees with plan as detailed above. Discussed plan of care with [GB.1] Saravanan[GB.2]    Jonny Gomez RN, N[GB. 1]P[GB. 2]  [facility] PMH[GB.1]  Past Medical History:   Diagnosis Date   • A-fib [facility])    • Cellulitis and abscess of other specified site    • Cognitive communication deficit    • DVT (deep venous thrombosis) ([facility] [address]    • Esophageal reflux    • Hypertrophy of prostate without u NA  132*  136  135*  138  140   K  3.9  4.0  4.0  4.1  3.3   CL  94*  103  106  106  104   CO2  29  27  24  25  27   BUN  13  14  17  18  21*   CREATSERUM  0.94  0.95  0.75  0.96  1.00   GLU  108*  151*  108*  144*  163*   CA  8.7  8.1*  7.8*  8.1*  8.4* 1. Fall vs syncope[MO.2]  2. NSVT[MO.3]  - 8 beats @ 150-160 BPM 6/14/18[MO.4]  - preserved EF  3. Leukocytosis  - some drainage from right hip pinning  4.  DVT  - warfarin  - CT negative for PE[MO.3]    PLAN:[MO.1]  Difficult and inconsistent history from • Thoracic aortic atherosclerosis (HCC)    • Unspecified essential hypertension        Family History   Problem Relation Age of Onset   • Heart Disease Mother       Past Surgical History:  No date: HIP REPLACEMENT SURGERY  2011: HIP SURGERY Left  No date: Intake/Output Summary (Last 24 hours) at 06/15/18 0938  Last data filed at 06/15/18 0600   Gross per 24 hour   Intake             1020 ml   Output              251 ml   Net              769 ml[MO.6]       Hemodynamic parameters (last 24 hours): 1526   TROP  0.03     Recent Labs   Lab  06/15/18   0551   RBC  3.14*   HGB  9.8*   HCT  29.7*   MCV  94.4   MCH  31.3   MCHC  33.2   RDW  15.7*   WBC  17.3*   PLT  254[MO.6]         Imaging:[MO.1]  Xr Chest Ap Portable  (cpt=71045)    Result Date: 6/14/2 MO.6 - Jaimie Mchugh MD on 6/15/2018  9:38 AM                     D/C Summary     No notes of this type exist for this encounter.          Physical Therapy Notes (last 72 hours) (Notes from 6/16/2018  1:05 PM through 6/19/2018  1:05 PM)      Physical The R Lower Extremity: Weight Bearing as Tolerated       PAIN ASSESSMENT   Ratin  Location: R hip  Management Techniques: Body mechanics;Repositioning; Activity promotion    BALANCE assistance level: moderate assistance with walker - rolling      Goal #2  Current Status  max A x2   Goal #3 Patient is able to ambulate 50 feet with assist device: walker - rolling at assistance level: minimum assistance   Goal #3   Current Status  3' RW required assist for controlled lower to chair. Chair height elevated to prevent breaking hip precautions. Educated patient in benefit of AE for increasing independence with LE dressing while maintaining precautions.  He will benefit from continued training Static Standing: fair  Dynamic Standing: fair    FUNCTIONAL ADL ASSESSMENT  Grooming: indep  Feeding: indep  Bathing: max a   Toileting: max a   Upper Extremity Dressing: max a   Lower Extremity Dressing: max a     Education Provided: Educated patient in r

## (undated) NOTE — IP AVS SNAPSHOT
Patient Demographics     Address  [address]   [facility] [place] [address] Phone  [phone] [facility])  [phone] [facility])      Emergency Contact(s)     Name Relation Home Work 72 Chely Wilkinson Lee [phone]        Allergies as Next dose due:  TONIGHT      Take 1&1/2 to 2 tablet(s) daily as directed by [facility].    Verona Ryan MD               Where to Get Your Medications      Please  your prescriptions at the location directed by your doctor o Patient's Most Recent Weight    Flowsheet Row Most Recent Value   Patient Weight  102.8 kg (226 lb 11.2 oz)         Lab Results Last 24 Hours      PROTHROMBIN TIME (PT) [[id number]] (Abnormal)  Resulted: 06/11/18 0510, Result status: Final result   Ordering Calculated Osmolality 282 275 - 295 mOsm/kg — [facility]   GFR, Non-African American >60 >=60 — [facility]   GFR, African-American >60 >=60 [employer]   Comment:           Estimated GFR units: mL/min/1.73 square meters   eGFR calculated by the CKD- Staph Aureus Screen By PCR Positive (A)     MRSA Screen By PCR Negative         H&P - H&P Note      H&P signed by Praveena Patterson MD at 6/9/2018 10:31 AM  Version 1 of 1    Author:  Praveena Patterson MD Service:  Hospitalist Author Type:  Physici Smoking status: Former Smoker     Smokeless tobacco: Never Used    Comment: very social smoker in 80s    Alcohol use No        Fam Hx  History reviewed. No pertinent family history.     Review of Systems  Comprehensive ROS reviewed and negative except fo PROCEDURE: XR HIP W OR WO PELVIS 2 OR 3 VIEWS, RIGHT (CPT=73502)  COMPARISON: None. INDICATIONS: Right sided hip pain post fall today. TECHNIQUE:  3 views were obtained.     FINDINGS:  BONES: There is an acute intertrochanteric fracture of the right hip w -BMP, CBC ok; UA clear    Hypoxia  -likely atelectasis, on 2L, encourage IS    Afib  -BB, tele  -hold coumadin for surgery, got Vit K, INR 2.7 --> 1.4    HL  -statin    BPH  Proscar    Dispo: OR  -Pt lives in [facility] assisted living    Outpatient records Past Medical History:   Diagnosis Date   • A-fib [facility])    • Cellulitis and abscess of other specified site    • Cognitive communication deficit    • DVT (deep venous thrombosis) ([facility] [address]    • Esophageal reflux    • Hypertrophy of prostate without urinary obst General: Alert, orientated x3. Cooperative. No apparent distress. Vital Signs:  Blood pressure 100/49, pulse 58, temperature 97.4 °F (36.3 °C), temperature source Oral, resp. rate 18, height 6' 3\" (1.905 m), weight 222 lb 9.6 oz (101 kg), SpO2 97 %. Right hip fx. Plan right hip pinning. Discussed risks of infection, dvt, bleeding failure of component,. Continued pain. Pt. Understands risk and postop course. Will proceed today. INR corrected for surgery.         Rafael Interiano  6/9/2018  9:16 AM[JN and then sat to rest.  He then stood again to the RW and amb 4 steps to turn toward the bs chair. He sat down on chair and was reclined and positioned comfortably. PM SESSION: patient was still in bedside chair from the morning session.   After discussion • Hypertrophy of prostate without urinary obstruction and other lower urinary tract symptoms (LUTS)    • Muscle weakness (generalized)    • Osteoarthritis    • Other and unspecified hyperlipidemia    • Pulmonary embolism [facility])    • Thoracic aortic atherosc Room air  No shortness of breath  Heart Rate: 66  Blood Pressure: 78/06    AM-PAC '6-Clicks' INPATIENT SHORT FORM - BASIC MOBILITY  How much difficulty does the patient currently have. ..[JS.1]  -   Turning over in bed (including adjusting bedclothes, sheet Goal #5 Patient independently performs home exercise program for ROM/strengthening per the instructions provided in preparation for discharge. Goal #5  Current Status[JS.1]         [employer]    JS. 1 - Silvana Hodgkin, PT on 6/10/2018  4:43 PM  BHUMI. pain with movement and feeling unsteady on his feet. In this OT evaluation patient presents with the following impairments: increased pain and decreased strength,endurance, balance, and overall activity tolerance.   These deficits manifest functionally whi Other Equipment:  (Rollator)    Occupation/Status: retired     Drives: No  Patient Regularly Uses: Glasses[CL. 2]    Stairs in Home: 0  Assistive Device(s) Used: rollator     Prior Level of [place]: Pt reports modified independence with ADL's and IADL' Bedroom Mobility: mod A x 2  With RW; simulated in room    BALANCE ASSESSMENT  Static Sitting: CGA  Dynamic Sitting: Min A  Static Standing: Min A  Dynamic Standing: mod A x 2    FUNCTIONAL ADL ASSESSMENT  Grooming: setup  Feeding: setup  Bathing: mod A  T

## (undated) NOTE — IP AVS SNAPSHOT
[facility]            (For Outpatient Use Only) Initial Admit Date: 6/8/2018   Inpt/Obs Admit Date: Inpt: 6/8/18 / Obs: N/A   Discharge Date:    Thomas Javed:  [de-identified]   MRN: [de-identified]   CSN: [id number]        ENCOUNTER  Patient Class: Subscriber Name:  Thuy Elena :    Subscriber ID:  Pt Rel to Subscriber:    Hospital Account Financial Class: Medicare    2018

## (undated) NOTE — IP AVS SNAPSHOT
Patient Demographics     Address  [facility] [address] Phone  [phone] [facility]) *Preferred*  [phone] [facility])      Emergency Contact(s)     Name Relation Home Work Mobile    Lesjannalori [phone]        Allergies as of 3/22 Take 2.5 mg by mouth 2 (two) times daily. balsam peru-castor oil Oint      Elana;ly to area   Duncan Early NP         bumetanide 1 MG Tabs  Commonly known as:  BUMEX  Next dose due:  3/23/19 - morning! Take 1 mg by mouth daily.           colch Take 1 capsule by mouth 2 (two) times daily. Systane Vitamin 500 mg-139 mg-264 mg-4 unit capsule          Pravastatin Sodium 20 MG Tabs  Commonly known as:  PRAVACHOL  Next dose due:  3/23/19 - morning! Take 1 tablet (20 mg total) by mouth daily.    Dimas Whitaker [id number] apixaban (ELIQUIS) tab 2.5 mg 03/22/19 1010 Given      [id number] bumetanide (BUMEX) tab 1 mg 03/22/19 1009 Given      [id number] colchicine tab 0.6 mg 03/22/19 1009 Given      [id number] finasteride (PROSCAR) tab 5 mg 03/22/19 1010 Given      2617 MAGNESIUM [[id number]] (Normal)  Resulted: 03/22/19 0724, Result status: Final result   Ordering provider:  Alonso Pittman MD  03/21/19 2300 Resulting lab:  [facility]    Specimen Information    Type Source Collected On   Blood — 03/22/19 0700          Comp Order Status:  Completed Lab Status:  Final result Updated:  03/20/19 0610    Specimen:  Urine, clean catch      Urine Culture No Growth at 18-24 hrs.     Emergency MRSA Screen by PCR STAT [[id number]]  (Abnormal) Collected:  03/18/19 1906    Order Status: Past Medical History:   Diagnosis Date   • A-fib [facility])    • Cellulitis and abscess of other specified site    • Cognitive communication deficit    • DVT (deep venous thrombosis) ([facility] [address]    • Esophageal reflux    • Hypertrophy of prostate without urinary obst Abdomen:   Soft, non-tender. Bowel sounds normal. No masses,  No organomegaly. Non distended[TN.1]  : cleary in place, no scrotal cellulitis[TN.2]   Extremities:[TN.1] Chronic edema and venous stasis[TN.2]   Skin: Skin[TN.1] venous stasis[TN.2].     Neurol Lesser incidental findings as above. Results of this examination were discussed with the patient's physician, Dr. Jesi Goddard, by Dr. Quinones at [phone] on 03/18/2019.    Dictated by (CST): Gabriele Felton MD on 3/18/2019 at 18:38     Approved by (CST): Frank Hamman -pt/ot eval when appropriate    GOC:  - patient DNR on admission  - did not want central line, ok with PICC in am  - ok with pressors  - POA is friend Tae Obregon, attempted to call was unable to get through[TN.2]    FN:  - IVF:[TN.1] continue IVF[TN.2]  - Diet:[T Assessment/Plan:[VB.1]  1. Septic shock - urinary source  - IVF resuscitation  - wean off pressors  - trend LA to clear  - on merrem and vanc  - f/u cultures and narrow as able  2.  UTI - likely from malpositioned cleary  - cleary replaced  - abx and cultures [facility]/-Dr. Linnette Tsang     Social History:Social History    Tobacco Use      Smoking status: Former Smoker      Smokeless tobacco: Never Used      Tobacco comment: very social smoker in 1950s    Alcohol use: No      Alcohol/week: 0.0 oz    Family History:  in [START ON 3/20/2019] Pravastatin Sodium (PRAVACHOL) tab 20 mg 20 mg Oral Daily   [COMPLETED] sodium chloride 0.9% IV bolus 500 mL 500 mL Intravenous Once   mupirocin (BACTROBAN) 2% nasal ointment OINT 1 Application 1 Application Each Nare BID     • sodium TP   --    --   6.4  6.0*     No results for input(s): ABGPHT, SCHAKO1A, HJDLE9N, ABGHCO3, ABGBE, TEMP, ELEANOR, SITE, DEV, THGB in the last 168 hours.     Invalid input(s): TAK64EVJ, CHOB  Recent Labs   Lab  03/18/19   1320  03/18/19   1650  03/18/19   1808 Discharge Diagnoses:     Reason for admission  Per H/P Dated 3/18/2019  \"Patient is a 80 year old male with PMH sig for a-fib, GERD, BPH, urinary retention with chronic cleary, prior PE/DVT,prior hemorrhoidal bleeding, HLD, debility s/p R hip fracture repa - cleary exchanged in ED after CT findings with cleary in penile urethra   - has numerous bladder and and renal stones, all non obstructing  - s/p sepsis bolus, off of levophed with LA normalized  - UC/BC with NGTD, transitioned to levaquin to treat for tota Start taking on:  3/23/2019     Senna-Docusate Sodium 8.6-50 MG Tabs  Commonly known as:  SENOKOT S  Take 2 tablets by mouth daily.   Start taking on:  3/23/2019        CHANGE how you take these medications    metoprolol Tartrate 25 MG Tabs  Commonly known Information about where to get these medications is not yet available    Ask your nurse or doctor about these medications  · Senna-Docusate Sodium 8.6-50 MG Tabs         Activity: activity as tolerated  Diet: cardiac diet  Wound Care: none needed  Code Sta admitted from [address] [place] [address] where he is attending rehab; he lives at [facility] assisted living. Patient presented in bed with no complaint of pain, reports pain in lower extremities when they are placed in dependent position.  Patient require Afib, GERD, and LE edema     Problem List[MD.1]  Principal Problem:    Urinary tract infection associated with indwelling urethral catheter, [facility]  Active Problems:    Urinary tract infection associated with indwelling urethral catheter ( WEIGHT BEARING RESTRICTION[MD.1]  Weight Bearing Restriction: None[MD.2]                PAIN ASSESSMENT[MD.1]  Rating: Unable to rate  Location: lower extremities  Management Techniques: Activity promotion; Body mechanics;Repositioning[MD.2]    COGNITION  · Functional activity tolerated  Posture  Strengthening  Transfer training[MD.1]    Patient End of Session: In bed;Call light within reach; Needs met;RN aware of session/findings; All patient questions and concerns addressed[MD.2]    CURRENT GOALSGoals to be m following impairments:[AO.1] weakness, impaired balance[AO.2]. These deficits manifest functionally while performing[AO. 1] ADLs and mobility[AO.2].  The patient is below baseline and would benefit from skilled inpatient OT to address the above deficits, ma OCCUPATIONAL THERAPY MEDICAL/SOCIAL HISTORY     Problem List  Principal Problem:    Urinary tract infection associated with indwelling urethral catheter, [facility]  Active Problems:    Urinary tract infection associated with indwelling urethr Fall Risk: High fall risk    PAIN ASSESSMENT  Ratin       O2 SATURATIONS[AO.1]   Room Air[AO.2]        COGNITION[AO.1]  Overall Cognitive Status:  WFL - within functional limits[AO.2]      RANGE OF MOTION   Upper extremity ROM is within functional limi Patients self stated goal is:[AO.1] to return to [facility][AO.2]     Patient will complete functional transfer with[AO.1] mod a[AO.2]   Comment:     Patient will complete[AO.1] [address] in unsupported sitting with setup[AO.2]  Comment:                   [facility]

## (undated) NOTE — IP AVS SNAPSHOT
Patient Demographics     Address  [facility] [place] [address] Phone  [phone] [facility]) *Preferred*  [phone] [facility])      Emergency Contact(s)     Name Relation Home Work Mobile    Gera [phone]        Allergies as of 7/8/ Next dose due: Tonight 7/8      Take 2.5 mg by mouth 2 (two) times daily. balsam peru-castor oil Oint      Elana;ly to stanley Juarez NP         bumetanide 1 MG Tabs  Commonly known as:  BUMEX  Next dose due:   Tomorrow 7/9      Take 1 mg by m Commonly known as:  PRAVACHOL  Next dose due: Tomorrow 7/9      Take 1 tablet (20 mg total) by mouth daily. Antonio Murrieta MD         Senna-Docusate Sodium 8.6-50 MG Tabs  Commonly known as:  SENOKOT S  Next dose due:   Tomorrow 7/9      Take 2 t SpO2  94 % Filed at 07/08/2019 0829      Patient's Most Recent Weight       Most Recent Value   Patient Weight  117.1 kg (258 lb 2 oz)         Lab Results Last 24 Hours      BASIC METABOLIC PANEL (8) [[id number]] (Abnormal)  Resulted: 07/08/19 0607, Result URINALYSIS, ROUTINE [[id number]] (Abnormal)  Resulted: 07/07/19 1803, Result status: Final result   Ordering provider:  Trupti Gifford MD  07/07/19 1510 Resulting lab:  [facility]    Specimen Information    Type Source Collected On   Urine — 07/07/19 173 Gentamicin <=1  Sensitive    Levofloxacin >=8  Resistant    Meropenem <=0.25  Sensitive    Nitrofurantoin <=16  Sensitive    Piperacillin + Tazobactam <=4  Sensitive    Trimethoprim/Sulfa <=20  Sensitive               Susceptibility      Proteus mirabilis Order Status:  Completed Lab Status:  Final result Updated:  07/04/19 9899    Specimen:  Other from Nares      MRSA Screen By PCR Positive    Narrative:       A positive result does not necessarily indicate the presence of viable organisms.  For [employer] recurrent urosepsis, may need to reconsider,[GV.3] d/w with Dr. 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 Shavon Moe        ALL:[GV.1]    Sulfa Antibiotics       UNKNOWN[GV.2]     Home Medications:[GV.1]    Outpatient Medications Marked as Taking for the 7/3/19 encounter [facility] E CREATSERUM 2.38 07/04/2019    BUN 46 07/04/2019     07/04/2019    K 4.9 07/04/2019     07/04/2019    CO2 24.0 07/04/2019     07/04/2019    CA 8.2 07/04/2019    ALB 2.3 07/04/2019    ALKPHO 167 07/04/2019    BILT 0.6 07/04/2019    TP 6. 3 EMELY Hospitalist H&P       CC:[GV.1] Patient presents with:  Altered Mental Status (neurologic)[GV.2]       PCP:[GV.1] Chad Hernández MD[GV.2]    Date of Admission: 7/3/2019  9:50 PM    ASSESSMENT / PLAN:     Mr. 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 [GV.3]     PMH[GV.1]  Past Medical History:   Diagnosis Date   • A-fib [facility])    • Arrhythmia    • BP 95/63   Pulse 69   Temp (!) 96.1 °F (35.6 °C) (Temporal)   Resp 18   Ht 182.9 cm (6')   Wt 240 lb 4.8 oz (109 kg)   SpO2 98%   BMI 32.59 kg/m²[GV.2]        EXAM:   GEN: elderly male in NAD  HEENT: EOMI  Neck: Supple  Pulm: CTAB, no crackles or wheezes but overall little change from March 18, 2019. 10. A preliminary report was submitted and there is agreement without major discrepancies. 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  In the ED, Outpatient Medications Marked as Taking for the 7/3/19 encounter [facility] Encounter): Magnesium 500 MG Oral Tab Take 400 mg by mouth twice a week. Disp:  Rfl:    lactulose 10 GM/15ML Oral Solution Take 20 g by mouth daily.  Disp:  Rfl:[GV.2]          Soc ALB 2.3 07/04/2019    ALKPHO 167 07/04/2019    BILT 0.6 07/04/2019    TP 6.3 07/04/2019    AST 25 07/04/2019    ALT 20 07/04/2019    PTT 36.4 07/03/2019    INR 1.09 07/04/2019       No results for input(s): TROP in the last 168 hours. [GV.2]    Additional dependent for about 1 year. It looks like he was here for a hip or leg pinning after a fracture. Horton had been inserted and they were not able to remove this. 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   He presented 1.   Septic shock:  He is now off pressors. There is Proteus bacteremia, and, clinically, he has some left pyelonephritis. This appears to be a Horton-induced urinary tract infection. I would continue Zosyn for now.   The dose will be lowered given his ag bleeding, HLD, debility s/p R hip fracture repair, HLD, aortic atherosclerosis, hx sunil's gangrene s/p I&D, cultures with pseudomonas and MRSA, and most recent admission in March 2019 with urosepsis requiring pressors who presents with AMS, septic shoc - cleary exchanged in ED without issue per notes; discussed with NH, last exchanged on 6/23/19 and has been changed monthly  - previously seen by [facility] urology, did not have plans for [facility] but given recurrent urosepsis, may need to reconsider, d/w with Dr. Leidy Brennan Result Date: 7/5/2019  CONCLUSION:  1.  Bibasilar pulmonary opacities likely representing atelectasis however correlate for symptoms of pneumonia. There are also tiny bibasilar pleural effusions. 2.  Coronary atherosclerosis.  3.  Two nonobstructing calcul Commonly known as:  ZYLOPRIM     apixaban 2.5 MG Tabs  Commonly known as:  ELIQUIS     balsam peru-castor oil Oint  Elana;ly to area     bumetanide 1 MG Tabs  Commonly known as:  BUMEX     colchicine 0.6 MG Tabs     docusate sodium 100 MG Caps  Commonly know Follow-up with labs: BMP in 1 week    Total Time Coordinating Care: Greater than 30 minutes    Patient had opportunity to ask questions and state understand and agree with therapeutic plan as outlined      Ivanna Deleon MD  [facility] Hospitalist[GV.1] tolerated seated ~10 min starting at [facility] for balance, fatiguing to MOD A, frequent verbal cues to correct posterior trunk lean. Deferred transfer training d/t significantly limited activity tolerance.  Pt returned to supine in bed at MAX A x 2, boosted to H • DVT (deep venous thrombosis) (HCC)    • Esophageal reflux    • Hypertrophy of prostate without urinary obstruction and other lower urinary tract symptoms (LUTS)    • Muscle weakness (generalized)    • Osteoarthritis    • Other and unspecified hyperlipide Lower extremity strength is[AO.1] functionally weak; needs 2 assist for mobility[AO.2]    BALANCE  Static Sitting: Poor  Dynamic Sitting: Poor -  Static Standing: Not tested  Dynamic Standing: Not tested    ACTIVITY TOLERANCE[AO.1]  Pre-activity, supine: Goal #1 Patient is able to demonstrate supine - sit EOB @ level:[AO.1] modified independent[AO.2]     Goal #1   Current Status    Goal #2 Patient is able to demonstrate transfers[AO.1] EOB to/from Chair/Wheelchair[AO.2] at assistance level:[AO.1] moderate session. Pt received supine in bed, agreeable to therapy. Pt observed to have significant BLE edema, reports worsened with illness. [AO.1] Pt also with hx R hip fx and ORIF, reports difficulty moving RLE after repair.[AO.2]     Pt performed supine to sit tr PHYSICAL THERAPY MEDICAL/SOCIAL HISTORY     Problem List  Principal Problem:    Septic shock ([facility] [address]  Active Problems:    Acute cystitis without hematuria    Acute kidney injury [facility])      Past Medical History  Past Medical History:   Diagnosis Date   • A-fi · Overall Cognitive Status:  WFL - within functional limits  · Following Commands:  follows one step commands consistently[AO. 2]    RANGE OF MOTION AND STRENGTH ASSESSMENT  Upper extremity ROM and strength are within functional limits     Lower extremity R Patient End of Session: In bed;Needs met;Call light within reach;RN aware of session/findings; All patient questions and concerns addressed;SCDs in place    CURRENT GOALS    Goals to be met by:[AO.1] 7/19/19[AO.2]  Patient Goal Patient's self-stated goal is Pt seen upright 90 degrees in bed for meal assessment. Pt requests an upgrade to regular solids. RN reports pt tolerates AM meal and medication with no overt CSA. SLP reviewed safe swallowing compensatory strategies/aspiration precautions with the patient. of trials. SLP to f/u with full meal assessment x1 as able.        UPDATED    Goal #2 The patient will utilize compensatory strategies as outlined by  BSSE (clinical evaluation) including Slow rate, Small bites, Small sips, Alternate liquids/solids, No stra upgrade to regular solids. RN reports pt tolerates AM meal and medication with no overt CSA. SLP reviewed safe swallowing compensatory strategies/aspiration precautions with the patient. Patient verbalized understanding.  Pt tolerated regular solids and thi BSSE (clinical evaluation) including Slow rate, Small bites, Small sips, Alternate liquids/solids, No straws, Upright 90 degrees, Eliminate distractions, Supervision with meals with mild  assistance 100 % of the time across 2 sessions.   After thorough rev

## (undated) NOTE — LETTER
9/21/2017              [address]         [address] [facility] [address]         Dear Blaise Hoffman,      It was a pleasure to see you at our [address] [place] [address] office.   Your urine that was s

## (undated) NOTE — IP AVS SNAPSHOT
[facility]            (For Outpatient Use Only) Initial Admit Date: 6/14/2018   Inpt/Obs Admit Date: Inpt: 6/14/18 / Obs: N/A   Discharge Date:    Emeka Pouch:  [de-identified]   MRN: [de-identified]   CSN: [id number]        ENCOUNTER  Patient Class Subscriber Name:  Christine Aldana :    Subscriber ID:  Pt Rel to Subscriber:    Hospital Account Financial Class: Medicare    2018

## (undated) NOTE — IP AVS SNAPSHOT
[facility]            (For Outpatient Use Only) Initial Admit Date: 3/18/2019   Inpt/Obs Admit Date: Inpt: 3/18/19 / Obs: N/A   Discharge Date:    Merlin Blake:  [de-identified]   MRN: [de-identified]   CSN: [id number]        ENCOUNTER  Patient Class Subscriber ID:  Pt Rel to Subscriber:    Hospital Account Financial Class: Medicare    March 22, 2019

## (undated) NOTE — IP AVS SNAPSHOT
[facility]            (For Outpatient Use Only) Initial Admit Date: 7/3/2019   Inpt/Obs Admit Date: Inpt: 7/4/19 / Obs: N/A   Discharge Date:    Evangelina Johnson:  [de-identified]   MRN: [de-identified]   CSN: [id number]   CEID: [id number]        [facility] Subscriber Name:  Geovanny Tatum :    Subscriber ID:  Pt Rel to Subscriber:    Hospital Account Financial Class: Medicare    2019

## (undated) NOTE — ED AVS SNAPSHOT
Leila Tam   MRN: [id number]    Department:  [facility] Emergency Department   Date of Visit:  12/17/2017           Disclosure     Insurance plans vary and the physician(s) referred by the ER may not be covered by your plan.  Please conta within the next three months to obtain basic health screening including reassessment of your blood pressure.     IF THERE IS ANY CHANGE OR WORSENING OF YOUR CONDITION, CALL YOUR PRIMARY CARE PHYSICIAN AT ONCE OR RETURN IMMEDIATELY TO THE EMERGENCY DEPARTMEN